# Patient Record
Sex: FEMALE | Race: WHITE | NOT HISPANIC OR LATINO | Employment: UNEMPLOYED | URBAN - METROPOLITAN AREA
[De-identification: names, ages, dates, MRNs, and addresses within clinical notes are randomized per-mention and may not be internally consistent; named-entity substitution may affect disease eponyms.]

---

## 2017-05-15 ENCOUNTER — ALLSCRIPTS OFFICE VISIT (OUTPATIENT)
Dept: OTHER | Facility: OTHER | Age: 82
End: 2017-05-15

## 2017-06-18 ENCOUNTER — GENERIC CONVERSION - ENCOUNTER (OUTPATIENT)
Dept: OTHER | Facility: OTHER | Age: 82
End: 2017-06-18

## 2017-06-18 ENCOUNTER — APPOINTMENT (OUTPATIENT)
Dept: LAB | Facility: HOSPITAL | Age: 82
End: 2017-06-18
Payer: COMMERCIAL

## 2017-06-18 ENCOUNTER — TRANSCRIBE ORDERS (OUTPATIENT)
Dept: LAB | Facility: HOSPITAL | Age: 82
End: 2017-06-18

## 2017-06-18 DIAGNOSIS — M81.0 AGE-RELATED OSTEOPOROSIS WITHOUT CURRENT PATHOLOGICAL FRACTURE: ICD-10-CM

## 2017-06-18 DIAGNOSIS — F03.90 DEMENTIA WITHOUT BEHAVIORAL DISTURBANCE (HCC): ICD-10-CM

## 2017-06-18 DIAGNOSIS — I10 ESSENTIAL (PRIMARY) HYPERTENSION: ICD-10-CM

## 2017-06-18 DIAGNOSIS — D64.9 ANEMIA: ICD-10-CM

## 2017-06-18 LAB
ALBUMIN SERPL BCP-MCNC: 3 G/DL (ref 3.5–5)
ALP SERPL-CCNC: 110 U/L (ref 46–116)
ALT SERPL W P-5'-P-CCNC: 18 U/L (ref 12–78)
ANION GAP SERPL CALCULATED.3IONS-SCNC: 5 MMOL/L (ref 4–13)
AST SERPL W P-5'-P-CCNC: 24 U/L (ref 5–45)
BASOPHILS # BLD AUTO: 0.02 THOUSANDS/ΜL (ref 0–0.1)
BASOPHILS NFR BLD AUTO: 0 % (ref 0–1)
BILIRUB SERPL-MCNC: 0.49 MG/DL (ref 0.2–1)
BUN SERPL-MCNC: 24 MG/DL (ref 5–25)
CALCIUM SERPL-MCNC: 8.4 MG/DL (ref 8.3–10.1)
CHLORIDE SERPL-SCNC: 105 MMOL/L (ref 100–108)
CO2 SERPL-SCNC: 29 MMOL/L (ref 21–32)
CREAT SERPL-MCNC: 0.69 MG/DL (ref 0.6–1.3)
EOSINOPHIL # BLD AUTO: 0.14 THOUSAND/ΜL (ref 0–0.61)
EOSINOPHIL NFR BLD AUTO: 2 % (ref 0–6)
ERYTHROCYTE [DISTWIDTH] IN BLOOD BY AUTOMATED COUNT: 14.2 % (ref 11.6–15.1)
GFR SERPL CREATININE-BSD FRML MDRD: >60 ML/MIN/1.73SQ M
GLUCOSE P FAST SERPL-MCNC: 86 MG/DL (ref 65–99)
HCT VFR BLD AUTO: 34.2 % (ref 34.8–46.1)
HGB BLD-MCNC: 11.3 G/DL (ref 11.5–15.4)
LYMPHOCYTES # BLD AUTO: 2.77 THOUSANDS/ΜL (ref 0.6–4.47)
LYMPHOCYTES NFR BLD AUTO: 44 % (ref 14–44)
MCH RBC QN AUTO: 32.1 PG (ref 26.8–34.3)
MCHC RBC AUTO-ENTMCNC: 33 G/DL (ref 31.4–37.4)
MCV RBC AUTO: 97 FL (ref 82–98)
MONOCYTES # BLD AUTO: 0.43 THOUSAND/ΜL (ref 0.17–1.22)
MONOCYTES NFR BLD AUTO: 7 % (ref 4–12)
NEUTROPHILS # BLD AUTO: 3 THOUSANDS/ΜL (ref 1.85–7.62)
NEUTS SEG NFR BLD AUTO: 47 % (ref 43–75)
NRBC BLD AUTO-RTO: 0 /100 WBCS
PLATELET # BLD AUTO: 242 THOUSANDS/UL (ref 149–390)
PMV BLD AUTO: 10.2 FL (ref 8.9–12.7)
POTASSIUM SERPL-SCNC: 4.1 MMOL/L (ref 3.5–5.3)
PROT SERPL-MCNC: 6.7 G/DL (ref 6.4–8.2)
RBC # BLD AUTO: 3.52 MILLION/UL (ref 3.81–5.12)
SODIUM SERPL-SCNC: 139 MMOL/L (ref 136–145)
WBC # BLD AUTO: 6.37 THOUSAND/UL (ref 4.31–10.16)

## 2017-06-18 PROCEDURE — 36415 COLL VENOUS BLD VENIPUNCTURE: CPT

## 2017-06-18 PROCEDURE — 80053 COMPREHEN METABOLIC PANEL: CPT

## 2017-06-18 PROCEDURE — 85025 COMPLETE CBC W/AUTO DIFF WBC: CPT

## 2017-11-02 ENCOUNTER — ALLSCRIPTS OFFICE VISIT (OUTPATIENT)
Dept: OTHER | Facility: OTHER | Age: 82
End: 2017-11-02

## 2017-11-04 NOTE — PROGRESS NOTES
Assessment  1  Primary biliary cholangitis (571 6) (K74 3)    Plan  Primary biliary cholangitis    · Ursodiol 250 MG Oral Tablet; Take 2 Tablets in the morning and 1 tablet in the  evening   Rx By: Bella Cabrera; Dispense: 0 Days ; #:270 TAB; Refill: 5;For: Primary biliary cholangitis; KJ = N; Verified Transmission to [facility]; Last Updated By: Elaina Barry; 11/2/2017 2:24:00 PM   · (1) HEPATIC FUNCTION PANEL; Status:Active; Requested KDF:[id number];    Perform:[facility]; [id number]; Ordered; For:Primary biliary cholangitis; Ordered By:Alisia Quiñonez;   · (1) HEPATIC FUNCTION PANEL; Status:Active; Requested TXU:92KSK0924;    Perform:[facility]; [id number]; Ordered; For:Primary biliary cholangitis; Ordered By:Alisia Quiñonez;   · Follow-up visit in 6 months Evaluation and Treatment  Follow-up  Status: Hold For -  Scheduling  Requested for: 84VLG7393   Ordered; For: Primary biliary cholangitis; Ordered By: Elaina Barry Performed:  Due: 36ZCC3759    Discussion/Summary  Discussion Summary:   ASSESSMENT:  Isolated alkaline phosphatase elevation secondary to primary biliary cholangitis, seronegative  Patient has been improving with use of ursodiol, and her last alkaline phosphatase from 6 months ago was actually normal    Patient's family has opted against [facility] [address]  screening  Will recheck liver enzymes now and again in 6 months  Office visit with Dr Gudelia Mcintosh in 6 months; at that time can decide about continuing vs stopping ursodiol  I advised patient and patient's son to call the patient experiences any abdominal pain, nausea, jaundice, bowel habit disturbances, etc       Counseling Documentation With Imm: The patient, patient's family was counseled regarding diagnostic results,-- instructions for management,-- risk factor reductions,-- risks and benefits of treatment options,-- importance of compliance with treatment     Medication SE Review and Pt Understands Tx: Possible side effects of new medications were reviewed with the patient/guardian today  Chief Complaint  Chief Complaint Free Text Note Form: up; primary biliary cirrhosis, elevated alkaline phosphatase      History of Present Illness  HPI: female with dementia presents for follow-up regarding primary biliary cirrhosis  She was last seen in the office by Dr Marcus Mendoza a year ago when she was being treated with ursodiol for 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; she had severely elevated alkaline phosphatase back in 2014 but has been improving with the medication  Her last alkaline phosphatase in June of this year was 110  Patient's family had opted against hepatocellular carcinoma screening but were agreeable to continue treatment for PBC  History is currently obtained from patient's son who is present with the patient  The patient has not been experiencing any disturbances with her bowel habits, her appetite has been relatively good and she has not been experiencing significant weight loss  No apparent difficulties with swallowing  The patient denies any abdominal pain or nausea  History Reviewed: The history was obtained today from the patient and patient's family and I agree with the documented history  Review of Systems  Complete-Female GI Adult:   Note: Patient has dementia so review of systems is limited, patient is minimally verbal but does answer yes or no questions   Constitutional: No fever, no chills, feels well, no tiredness, no recent weight gain or weight loss  Eyes: No complaints of eye pain, no red eyes, no eyesight problems, no discharge, no dry eyes, no itching of eyes  ENT: no complaints of earache, no loss of hearing, no nose bleeds, no nasal discharge, no sore throat, no hoarseness  Cardiovascular: No complaints of slow heart rate, no fast heart rate, no chest pain, no palpitations, no leg claudication, no lower extremity edema     Respiratory: No complaints of shortness of breath, no wheezing, no cough, no SOB on exertion, no orthopnea, no PND  Gastrointestinal: as noted in HPI  Genitourinary: No complaints of dysuria, no incontinence, no pelvic pain, no dysmenorrhea, no vaginal discharge or bleeding  Musculoskeletal: No complaints of arthralgias, no myalgias, no joint swelling or stiffness, no limb pain or swelling  Integumentary: No complaints of skin rash or lesions, no itching, no skin wounds, no breast pain or lump  Neurological: No complaints of headache, no confusion, no convulsions, no numbness, no dizziness or fainting, no tingling, no limb weakness, no difficulty walking  Psychiatric: Not suicidal, no sleep disturbance, no anxiety or depression, no change in personality, no emotional problems  Endocrine: No complaints of proptosis, no hot flashes, no muscle weakness, no deepening of the voice, no feelings of weakness  Hematologic/Lymphatic: No complaints of swollen glands, no swollen glands in the neck, does not bleed easily, does not bruise easily  Active Problems  1  Allergic rhinitis (477 9) (J30 9)   2  Anemia (285 9) (D64 9)   3  Dementia (294 20) (F03 90)   4  Dilated bile duct (576 8) (K83 8)   5  Hypertension (401 9) (I10)   6  Limb swelling (729 81) (M79 89)   7  Medicare annual wellness visit, subsequent (V70 0) (Z00 00)   8  Need for 23-polyvalent pneumococcal polysaccharide vaccine (V03 82) (Z23)   9  Need for prophylactic vaccination and inoculation against influenza (V04 81) (Z23)   10  Need for vaccination with 13-polyvalent pneumococcal conjugate vaccine (V03 82) (Z23)   11  Osteoarthritis (715 90) (M19 90)   12  Osteoporosis (733 00) (M81 0)   13  Primary biliary cholangitis (571 6) (K74 3)   14  Sacral pressure sore (707 03,707 20) (L89 159)   15  Urge and stress incontinence (788 33) (N39 46)   16  Uterine prolapse (618 1) (N81 4)   17  Vascular dementia, uncomplicated (919 35) (C99 34)   18  Vitamin D deficiency (268 9) (E55 9)    Past Medical History  1  History of Abnormal gait (781 2) (R26 9)   2  History of Cellulitis (682 9) (L03 90)   3  History of Cellulitis of leg (682 6) (L03 119)   4  History of Coronary Artery Disease (V12 59)   5  History of hypokalemia (V12 29) (Z86 39)   6  History of peripheral neuropathy (V12 49) (Z86 69)   7  Need for prophylactic vaccination and inoculation against influenza (V04 81) (Z23)   8  History of Open Wound Of Right Lower Leg (894 0)   9  History of Scoliosis (737 30) (M41 9)   10  History of Stroke Syndrome (436)  Active Problems And Past Medical History Reviewed: The active problems and past medical history were reviewed and updated today  Surgical History  1  History of Cholecystectomy Laparoscopic   2  History of Repair Of Bladder Reconstruction  Surgical History Reviewed: The surgical history was reviewed and updated today  Family History  Sister    1  Family history of Hypertension (V17 49)   2  Family history of Hypothyroidism  Family History    3  Family history of No Significant Family History  Family History Reviewed: The family history was reviewed and updated today  Social History   · Denied: History of Alcohol   · Never A Smoker  Social History Reviewed: The social history was reviewed and updated today  The social history was reviewed and is unchanged  Current Meds   1  Aspirin-Dipyridamole ER  MG Oral Capsule Extended Release 12 Hour; take 1   capsule twice a day; Therapy: 61LVN2127 to (Evaluate:31Mar2018)  Requested for: 41ROR7074; Last   Rx:02Oct2017 Ordered   2  Desitin 13 % External Cream; apply to pressure sore on buttocks bid and after bm; Therapy: 16HQL3379 to (Evaluate:24Apr2017); Last Rx:47Lpi3632 Ordered   3  Ursodiol 250 MG Oral Tablet; Take 2 Tablets in the morning and 1 tablet in the evening; Therapy: 99ZSN0916 to (Last Rx:46Uio4478)  Requested for: 52Uzn1996 Ordered   4  Vitamin D3 1000 UNIT Oral Tablet; TAKE 1 TABLET DAILY;    Therapy: 50IVW3371 to (Evaluate:73Img8641); Last Rx:79Hqo7802 Ordered  Medication List Reviewed: The medication list was reviewed and updated today  Allergies  1  Bactrim TABS   2  Atropine Sulfate SOLN    Vitals  Vital Signs    Recorded: 78LHZ8140 02:13PM   Temperature 96 9 F   Heart Rate 80   Systolic 315   Diastolic 62   Weight Unobtainable Yes   O2 Saturation 90     Physical Exam    Constitutional   General appearance: Abnormal  -- Wheelchair-bound, minimally verbal, no apparent distress  Eyes   Conjunctiva and lids: No swelling, erythema or discharge  Pupils and irises: Equal, round and reactive to light  Ears, Nose, Mouth, and Throat   External inspection of ears and nose: Normal     Oropharynx: Normal with no erythema, edema, exudate or lesions  Pulmonary   Respiratory effort: No increased work of breathing or signs of respiratory distress  Auscultation of lungs: Clear to auscultation  Cardiovascular   Palpation of heart: Normal PMI, no thrills  Auscultation of heart: Normal rate and rhythm, normal S1 and S2, without murmurs  Examination of extremities for edema and/or varicosities: Normal     Carotid pulses: Normal     Abdomen   Abdomen: Non-tender, no masses  Liver and spleen: No hepatomegaly or splenomegaly  Lymphatic   Palpation of lymph nodes in neck: No lymphadenopathy  Musculoskeletal   Gait and station: Normal     Digits and nails: Normal without clubbing or cyanosis  Inspection/palpation of joints, bones, and muscles: Normal     Skin   Skin and subcutaneous tissue: Normal without rashes or lesions      Psychiatric   Orientation to person, place, and time: Normal     Mood and affect: Normal          Future Appointments    Date/Time Provider Specialty Site   11/15/2017 01:20 PM Porsha He[address] [place]   Electronically signed by : Yun [facility]; Nov 2 2017  2:29PM EST                       (Author)    Electronically signed by : NGUYỄN Rascon ; Nov  3 2017  9:52AM EST                       (Author)

## 2017-11-15 ENCOUNTER — ALLSCRIPTS OFFICE VISIT (OUTPATIENT)
Dept: OTHER | Facility: OTHER | Age: 82
End: 2017-11-15

## 2017-11-15 DIAGNOSIS — M81.0 AGE-RELATED OSTEOPOROSIS WITHOUT CURRENT PATHOLOGICAL FRACTURE: ICD-10-CM

## 2017-11-15 DIAGNOSIS — D64.9 ANEMIA: ICD-10-CM

## 2017-11-15 DIAGNOSIS — F03.90 DEMENTIA WITHOUT BEHAVIORAL DISTURBANCE (HCC): ICD-10-CM

## 2017-11-15 DIAGNOSIS — I10 ESSENTIAL (PRIMARY) HYPERTENSION: ICD-10-CM

## 2017-11-16 NOTE — PROGRESS NOTES
Assessment    1  Hypertension (401 9) (I10)   2  Dementia (294 20) (F03 90)   3  Osteoporosis (733 00) (M81 0)   4  Anemia (285 9) (D64 9)   5  Need for prophylactic vaccination and inoculation against influenza (V04 81) (Z23)    Plan  Anemia, Dementia, Hypertension, Osteoporosis    · (1) BASIC METABOLIC PROFILE; Status:Active; Requested for:76Gyy2031;    · (1) CBC/PLT/DIFF; Status:Active; Requested for:79Wsn6174;    · (1) TSH WITH FT4 REFLEX; Status:Active; Requested for:46Rqx8736; Health Maintenance    · *VB - Fall Risk Assessment  (Dx Z13 89 Screen for Neurologic Disorder);Status:Complete;   Done: 81CEA6807 01:38PM   · *VB-Depression Screening; Status:Complete;   Done: 00DPM9285 01:38PM  Need for prophylactic vaccination and inoculation against influenza    · Fluzone High-Dose 0 5 ML Intramuscular Suspension Prefilled Syringe;INJECT 0 5  ML Intramuscular; To Be Done: 90UYJ7374  Primary biliary cholangitis    · Ursodiol 250 MG Oral Tablet; Take 2 Tablets in the morning and 1 tablet in theevening  Vascular dementia, uncomplicated    · Aspirin-Dipyridamole ER  MG Oral Capsule Extended Release 12 Hour;take 1 capsule twice a day  Vitamin D deficiency    · Vitamin D3 1000 UNIT Oral Tablet; TAKE 1 TABLET DAILY    Discussion/Summary    Will check labs  current meds  GI as scheduled  flu shot today  prevnar 5/2015  Got pneumovax 6/2016  precautions  in 6 month or sooner as needed  Possible side effects of new medications were reviewed with the patient/guardian today  The treatment plan was reviewed with the patient/guardian  The patient/guardian understands and agrees with the treatment plan      Chief Complaint  Patient presents for a 6 month follow up      History of Present Illness  Pt is here with son-in-law  GI for elevated liver enzymes/primary biliary cholangitis  On ursodiol which helped  ---stable  Pt can feed herself  Appetite is good per son-in-law  BM normal  She is usually sit at home  No rash   No combative behavior  incontinence ---at least 4-5 pads per day had hx of CVA in 1992 and had left side deficit  She is on ASA-dipyridamole bid  Had some bruise on legs  today 138/86  Denies headache, SOB or CP  Does not take any BP meds  alendronate for 1 year  Refused more Dexa  with daughter and son-in-law  recent falls  Her dementia has been stable since the last visit  Symptoms: The patient is currently asymptomatic  Associated Symptoms: no frequent falls  Social Activities: The patient is currently living with family  Medications: the patient is adherent with her medication regimen  -- She denies medication side effects  The patient presents for follow-up of essential hypertension  The patient states she has been stable with her blood pressure control since the last visit  Comorbid Illnesses: a stroke  Symptoms: The patient is currently asymptomatic  Home monitoring: The patient is not checking blood pressure at home  Medications: The patient is not currently on any medications for her hypertension--lifestyle modification only  Review of Systems   Constitutional: No fever, no chills, feels well, no tiredness, no recent weight gain or weight loss  ENT: no complaints of earache, no loss of hearing, no nose bleeds, no nasal discharge, no sore throat, no hoarseness  Cardiovascular: No complaints of slow heart rate, no fast heart rate, no chest pain, no palpitations, no leg claudication, no lower extremity edema  Respiratory: No complaints of shortness of breath, no wheezing, no cough, no SOB on exertion, no orthopnea, no PND  Gastrointestinal: No complaints of abdominal pain, no constipation, no nausea or vomiting, no diarrhea, no bloody stools  Musculoskeletal: No complaints of arthralgias, no myalgias, no joint swelling or stiffness, no limb pain or swelling  Preventive Quality 65 and Older: The patient is currently asymptomatic Symptoms Include: no recent fall       Active Problems  1   Allergic rhinitis (477 9) (J30 9)   2  Anemia (285 9) (D64 9)   3  Dementia (294 20) (F03 90)   4  Dilated bile duct (576 8) (K83 8)   5  Hypertension (401 9) (I10)   6  Limb swelling (729 81) (M79 89)   7  Medicare annual wellness visit, subsequent (V70 0) (Z00 00)   8  Need for 23-polyvalent pneumococcal polysaccharide vaccine (V03 82) (Z23)   9  Need for prophylactic vaccination and inoculation against influenza (V04 81) (Z23)   10  Need for vaccination with 13-polyvalent pneumococcal conjugate vaccine (V03 82) (Z23)   11  Osteoarthritis (715 90) (M19 90)   12  Osteoporosis (733 00) (M81 0)   13  Primary biliary cholangitis (571 6) (K74 3)   14  Urge and stress incontinence (788 33) (N39 46)   15  Uterine prolapse (618 1) (N81 4)   16  Vascular dementia, uncomplicated (349 01) (X59 12)   17  Vitamin D deficiency (268 9) (E55 9)    Past Medical History  1  History of Abnormal gait (781 2) (R26 9)   2  History of Cellulitis (682 9) (L03 90)   3  History of Cellulitis of leg (682 6) (L03 119)   4  History of Coronary Artery Disease (V12 59)   5  History of hypokalemia (V12 29) (Z86 39)   6  History of peripheral neuropathy (V12 49) (Z86 69)   7  Need for prophylactic vaccination and inoculation against influenza (V04 81) (Z23)   8  History of Open Wound Of Right Lower Leg (894 0)   9  History of Scoliosis (737 30) (M41 9)   10  History of Stroke Syndrome (436)    Surgical History    1  History of Cholecystectomy Laparoscopic   2  History of Repair Of Bladder Reconstruction    Family History  Sister    1  Family history of Hypertension (V17 49)   2  Family history of Hypothyroidism  Family History    3  Family history of No Significant Family History    Social History     · Denied: History of Alcohol   · Never A Smoker    Current Meds   1  Aspirin-Dipyridamole ER  MG Oral Capsule Extended Release 12 Hour; take 1 capsule twice a day;  Therapy: 19PHK3404 to (394 6960)  Requested for: 85LBH3968; Last Rx:02Oct2017 Ordered 2  Desitin 13 % External Cream; apply to pressure sore on buttocks bid and after bm; Therapy: 83NIQ3392 to (Evaluate:24Apr2017); Last Rx:26Oct2016 Ordered   3  Ursodiol 250 MG Oral Tablet; Take 2 Tablets in the morning and 1 tablet in the evening; Therapy: 55HIO2098 to (Last Rx:21Tph1143)  Requested for: 03PMN2512 Ordered   4  Vitamin D3 1000 UNIT Oral Tablet; TAKE 1 TABLET DAILY; Therapy: 17HDP4684 to (Evaluate:09Mar2015); Last Rx:67Ctr9797 Ordered    Allergies  1  Bactrim TABS   2  Atropine Sulfate SOLN    Vitals  Vital Signs    Recorded: 92LEF2192 01:02PM   Temperature 97 7 F, Tympanic   Heart Rate 78, L Radial   Pulse Quality Normal, L Radial   Respiration Quality Normal   Respiration 16   Systolic 986, LUE, Sitting   Diastolic 86, LUE, Sitting   Height 4 ft 9 in   Weight 115 lb    BMI Calculated 24 89   BSA Calculated 1 42   Pain Scale 0       Physical Exam   Constitutional  General appearance: No acute distress, well appearing and well nourished  Pulmonary  Respiratory effort: No increased work of breathing or signs of respiratory distress  Auscultation of lungs: Clear to auscultation  Cardiovascular  Auscultation of heart: Normal rate and rhythm, normal S1 and S2, without murmurs  Examination of extremities for edema and/or varicosities: Normal    Carotid pulses: Normal    Abdomen  Abdomen: Non-tender, no masses  Liver and spleen: No hepatomegaly or splenomegaly  Lymphatic  Palpation of lymph nodes in neck: No lymphadenopathy  Musculoskeletal  Gait and station: Normal          Results/Data  (1) CBC/PLT/DIFF 26QKI1254 09:29AM Toanfito Perez    Order Number: [id number]     Test Name Result Flag Reference   WBC COUNT 6 37 Thousand/uL  4 31-10 16   RBC COUNT 3 52 Million/uL L 3 81-5 12   HEMOGLOBIN 11 3 g/dL L 11 5-15 4   HEMATOCRIT 34 2 % L 34 8-46  1   MCV 97 fL  82-98   MCH 32 1 pg  26 8-34 3   MCHC 33 0 g/dL  31 4-37 4   RDW 14 2 %  11 6-15 1   MPV 10 2 fL  8 9-12 7   PLATELET COUNT 367 Thousands/uL  149-390   nRBC AUTOMATED 0 /100 WBCs     NEUTROPHILS RELATIVE PERCENT 47 %  43-75   LYMPHOCYTES RELATIVE PERCENT 44 %  14-44   MONOCYTES RELATIVE PERCENT 7 %  4-12   EOSINOPHILS RELATIVE PERCENT 2 %  0-6   BASOPHILS RELATIVE PERCENT 0 %  0-1   NEUTROPHILS ABSOLUTE COUNT 3 00 Thousands/? ??L  1 85-7 62   LYMPHOCYTES ABSOLUTE COUNT 2 77 Thousands/? ??L  0 60-4 47   MONOCYTES ABSOLUTE COUNT 0 43 Thousand/? ??L  0 17-1 22   EOSINOPHILS ABSOLUTE COUNT 0 14 Thousand/? ??L  0 00-0 61   BASOPHILS ABSOLUTE COUNT 0 02 Thousands/? ??L  0 00-0 10     - Patient Instructions: This bloodwork is non-fasting  Please drink two glasses of water morning of bloodwork  (1) COMPREHENSIVE METABOLIC PANEL 30OIH5103 61:76TK Sue Mobley   TW Order Number: [id number]     Test Name Result Flag Reference   SODIUM 139 mmol/L  136-145   POTASSIUM 4 1 mmol/L  3 5-5 3   CHLORIDE 105 mmol/L  100-108   CARBON DIOXIDE 29 mmol/L  21-32   ANION GAP (CALC) 5 mmol/L  4-13   BLOOD UREA NITROGEN 24 mg/dL  5-25   CREATININE 0 69 mg/dL  0 60-1 30   Standardized to IDMS reference method   CALCIUM 8 4 mg/dL  8 3-10 1   BILI, TOTAL 0 49 mg/dL  0 20-1 00   ALK PHOSPHATAS 110 U/L     ALT (SGPT) 18 U/L  12-78   AST(SGOT) 24 U/L  5-45   ALBUMIN 3 0 g/dL L 3 5-5 0   TOTAL PROTEIN 6 7 g/dL  6 4-8 2   eGFR Non-African American      >60 0 ml/min/1 73sq [facility] Disease Education Program recommendations are as follows: GFR calculation is accurate only with a steady state creatinine Chronic Kidney disease less than 60 ml/min/1 73 sq  meters Kidney failure less than 15 ml/min/1 73 sq  meters  GLUCOSE FASTING 86 mg/dL  65-99       Signatures   Electronically signed by :  Angelic Foss MD; Nov 15 2017  1:39PM EST                       (Author)

## 2018-01-10 NOTE — RESULT NOTES
Message   TSH normal   WBC normal   Hg normal   Platelet normal   electrolytes normal   kidney function normal   liver enzymes normal    Alk phosphate 134 much better  vitD 37 3 normal     Verified Results  (1) COMPREHENSIVE METABOLIC PANEL 60FXD6199 06:16XW [employer]   TW Order Number: [id number]     Test Name Result Flag Reference   GLUCOSE,RANDM 86 mg/dL     If the patient is fasting, the ADA then defines impaired fasting glucose as > 100 mg/dL and diabetes as > or equal to 123 mg/dL  SODIUM 138 mmol/L  136-145   POTASSIUM 4 1 mmol/L  3 5-5 3   CHLORIDE 102 mmol/L  100-108   CARBON DIOXIDE 31 mmol/L  21-32   ANION GAP (CALC) 5 mmol/L  4-13   BLOOD UREA NITROGEN 22 mg/dL  5-25   CREATININE 0 74 mg/dL  0 60-1 30   Standardized to IDMS reference method   CALCIUM 8 7 mg/dL  8 3-10 1   BILI, TOTAL 0 34 mg/dL  0 20-1 00   ALK PHOSPHATAS 134 U/L H    ALT (SGPT) 20 U/L  12-78   AST(SGOT) 23 U/L  5-45   ALBUMIN 3 4 g/dL L 3 5-5 0   TOTAL PROTEIN 7 1 g/dL  6 4-8 2   eGFR Non-African American      >60 0 ml/min/1 73sq m   [facility] Disease Education Program recommendations are as follows:  GFR calculation is accurate only with a steady state creatinine  Chronic Kidney disease less than 60 ml/min/1 73 sq  meters  Kidney failure less than 15 ml/min/1 73 sq  meters  (1) CBC/PLT/DIFF 93GWU7524 08:59AM [employer]   TW Order Number: [id number]     Test Name Result Flag Reference   WBC COUNT 6 48 Thousand/uL  4 31-10 16   RBC COUNT 3 79 Million/uL L 3 81-5 12   HEMOGLOBIN 12 2 g/dL  11 5-15 4   HEMATOCRIT 36 6 %  34 8-46  1   MCV 97 fL  82-98   MCH 32 2 pg  26 8-34 3   MCHC 33 3 g/dL  31 4-37 4   RDW 13 5 %  11 6-15 1   MPV 9 9 fL  8 9-12 7   PLATELET COUNT 269 Thousands/uL  149-390   nRBC AUTOMATED 0 /100 WBCs     NEUTROPHILS RELATIVE PERCENT 54 %  43-75   LYMPHOCYTES RELATIVE PERCENT 36 %  14-44   MONOCYTES RELATIVE PERCENT 7 %  4-12   EOSINOPHILS RELATIVE PERCENT 2 %  0-6 BASOPHILS RELATIVE PERCENT 1 %  0-1   NEUTROPHILS ABSOLUTE COUNT 3 54 Thousands/?L  1 85-7 62   LYMPHOCYTES ABSOLUTE COUNT 2 33 Thousands/?L  0 60-4 47   MONOCYTES ABSOLUTE COUNT 0 45 Thousand/?L  0 17-1 22   EOSINOPHILS ABSOLUTE COUNT 0 10 Thousand/?L  0 00-0 61   BASOPHILS ABSOLUTE COUNT 0 03 Thousands/?L  0 00-0 10     (1) TSH WITH FT4 REFLEX 27Nov2016 08:59AM [facility]: [id number]     Test Name Result Flag Reference   TSH 3 090 uIU/mL  0 358-3 740   Patients undergoing fluorescein dye angiography may retain small amounts of fluorescein in the body for 48-72 hours post procedure  Samples containing fluorescein can produce falsely depressed TSH values  If the patient had this procedure,a specimen should be resubmitted post fluorescein clearance  The recommended reference ranges for TSH during pregnancy are as follows:  First trimester 0 1 to 2 5 uIU/mL  Second trimester  0 2 to 3 0 uIU/mL  Third trimester 0 3 to 3 0 uIU/m     (1) VITAMIN D 25-HYDROXY 27Nov2016 08:59AM [facility] Order Number: [id number]     Test Name Result Flag Reference   VIT D 25-HYDROX 37 3 ng/mL  30 0-100 0   This assay is a certified procedure of the CDC Vitamin D Standardization Certification Program (VDSCP)     Deficiency <20ng/ml   Insufficiency 20-30ng/ml   Sufficient  ng/ml     *Patients undergoing fluorescein dye angiography may retain small amounts of fluorescein in the body for 48-72 hours post procedure  Samples containing fluorescein can produce falsely elevated Vitamin D values  If the patient had this procedure, a specimen should be resubmitted post fluorescein clearance

## 2018-01-10 NOTE — PROGRESS NOTES
Assessment    1  Elevated serum alkaline phosphatase level (790 5) (R74 8)    Plan  Elevated serum alkaline phosphatase level    · (1) HEPATIC FUNCTION PANEL; Status:Hold For - Exact Date; Requested for:Approx  [id number];    Perform:[facility];Ordered;  For:Elevated serum alkaline phosphatase level; Ordered By:Rafita Hernandez;    Discussion/Summary  Discussion Summary: This is a pleasant 55-year-old female with elevated alkaline phosphatase  I doubt that this is due to obstructive process, the family was reluctant to proceed with MRI or any procedures  #1 elevated alkaline phosphatase: It has improved with ursodiol alone  She may have a component of primary biliary cirrhosis  Though serologically all her tests have been unremarkable  At this point given that she is doing well we'll continue her current therapies   -Continue ursodiol  -Repeat LFTs in 3 months, at that time if completely normal we can try to stop the ursodiol  -If she ever has pain, fevers, jaundice she will need additional imaging and possible ERCP  Chief Complaint  Chief Complaint Free Text Note Form: Follow-up of elevated LFTs      History of Present Illness  HPI: Is is a pleasant 55-year-old female who comes in for follow-up  As you know she's had an elevated alkaline phosphatase in the past in the absence of pain  Ultrasound did not show any obstructive process though her bile duct is mildly dilated  On previous visits after long discussions with her family we decided not stopped for further imaging or possibly procedures  She was placed on ursodiol and her LFTs have gradually improved  She's had no side effects, she denies any pruritus, her appetite is good  Her weight is stable  Review of Systems  ROS Reviewed:   ROS reviewed  Active Problems    1  Abnormal LFTs (liver function tests) (790 6) (R79 89)   2  Allergic rhinitis (477 9) (J30 9)   3  Anemia (285 9) (D64 9)   4  Cellulitis of leg (682 6) (L03 119)   5  Dementia (294 20) (F03 90)   6  Dilated bile duct (576 8) (K83 8)   7  Elevated serum alkaline phosphatase level (790 5) (R74 8)   8  Hypertension (401 9) (I10)   9  Hypokalemia (276 8) (E87 6)   10  Limb swelling (729 81) (M79 89)   11  Need for prophylactic vaccination and inoculation against influenza (V04 81) (Z23)   12  Need for vaccination with 13-polyvalent pneumococcal conjugate vaccine (V03 82) (Z23)   13  Open Wound Of Right Lower Leg (894 0)   14  Osteoarthritis (715 90) (M19 90)   15  Osteoporosis (733 00) (M81 0)   16  Urge and stress incontinence (788 33) (N39 46)   17  Uterine prolapse (618 1) (N81 4)   18  Vascular Dementia (290 40)   19  Vitamin D deficiency (268 9) (E55 9)    Past Medical History    1  History of Abnormal gait (781 2) (R26 9)   2  History of Cellulitis (682 9) (L03 90)   3  History of Coronary Artery Disease (V12 59)   4  History of peripheral neuropathy (V12 49) (Z86 69)   5  Need for prophylactic vaccination and inoculation against influenza (V04 81) (Z23)   6  History of Scoliosis (737 30) (M41 9)   7  History of Stroke Syndrome (436)  Active Problems And Past Medical History Reviewed: The active problems and past medical history were reviewed and updated today  Surgical History    1  History of Cholecystectomy Laparoscopic   2  History of Repair Of Bladder Reconstruction  Surgical History Reviewed: The surgical history was reviewed and updated today  Family History    1  Family history of Hypertension (V17 49)   2  Family history of Hypothyroidism    3  Family history of No Significant Family History    Social History    · Denied: History of Alcohol   · Never A Smoker    Current Meds   1  Aggrenox  MG Oral Capsule Extended Release 12 Hour; take 1 capsule twice a   day; Therapy: 65Vif0922 to (Evaluate:09Jan2016)  Requested for: 79VQL7718; Last   Rx:10Jne1352 Ordered   2   Aspirin-Dipyridamole ER  MG Oral Capsule Extended Release 12 Hour; take 1 capsule twice a day; Therapy: 29UKN7615 to (Evaluate:35Ouz8645)  Requested for: 87BIU3043; Last   Rx:06Jan2016 Ordered   3  Furosemide 20 MG Oral Tablet; TAKE 1 TABLET DAILY; Therapy: 57PXQ9924 to (Evaluate:44Rsl8698)  Requested for: 01DPJ8116; Last   Rx:19Nov2015 Ordered   4  Klor-Con M10 10 MEQ Oral Tablet Extended Release; take 1 tablet twice a day; Therapy: 24BFH7851 to (Evaluate:15Qew6338)  Requested for: 76TRB5457; Last   Rx:51Nwn5666 Ordered   5  Ursodiol 250 MG Oral Tablet; Take 2 Tablets in the morning and 1 tablet in the evening; Therapy: 11SSM0610 to (Claudetta Harry)  Requested for: 53YWB0368; Last   Rx:30Kws3845 Ordered   6  Vitamin D3 1000 UNIT Oral Tablet; TAKE 1 TABLET DAILY; Therapy: 26MTN2415 to (Evaluate:09Mar2015); Last Rx:38Skd2376 Ordered  Medication List Reviewed: The medication list was reviewed and updated today  Allergies    1  Bactrim TABS   2  Atropine Sulfate SOLN    Vitals  Vital Signs [Data Includes: Current Encounter]    Recorded: 42MJP8793 11:16AM   Heart Rate 72   Respiration 14   Systolic 311   Diastolic 70   Height 4 ft 9 in   Weight 111 lb    BMI Calculated 24 02   BSA Calculated 1 4   O2 Saturation 97     Physical Exam   Gen  : Elderly female, wheelchair bound, no acute distress  HEENT: Anicteric, moist mucous membranes, no cervical or supraclavicular lymphadenopathy  Cardiovascular: Regular rate and rhythm, no murmurs rubs or gallops  Pulmonary: Clear to auscultation bilaterally  Abdomen: Soft, nontender, nondistended  No hepatosplenomegaly  Bowel sounds present and regular    Extremities: No cyanosis, clubbing, or edema,  Skin: No rashes  Neuro: Alert, awake, oriented x3         Signatures   Electronically signed by : NGUYỄN Porter ; Jan 29 2016 11:55AM EST                       (Author)

## 2018-01-13 VITALS
HEART RATE: 76 BPM | RESPIRATION RATE: 12 BRPM | DIASTOLIC BLOOD PRESSURE: 88 MMHG | SYSTOLIC BLOOD PRESSURE: 144 MMHG | TEMPERATURE: 99 F

## 2018-01-13 VITALS
TEMPERATURE: 97.7 F | HEIGHT: 57 IN | HEART RATE: 78 BPM | BODY MASS INDEX: 24.81 KG/M2 | DIASTOLIC BLOOD PRESSURE: 86 MMHG | WEIGHT: 115 LBS | SYSTOLIC BLOOD PRESSURE: 138 MMHG | RESPIRATION RATE: 16 BRPM

## 2018-01-13 VITALS
DIASTOLIC BLOOD PRESSURE: 62 MMHG | TEMPERATURE: 96.9 F | OXYGEN SATURATION: 90 % | SYSTOLIC BLOOD PRESSURE: 124 MMHG | HEART RATE: 80 BPM

## 2018-01-13 NOTE — RESULT NOTES
Verified Results  (1) CBC/PLT/DIFF 92VKR4588 09:29AM Latrice Pena    Order Number: [id number]     Test Name Result Flag Reference   WBC COUNT 6 37 Thousand/uL  4 31-10 16   RBC COUNT 3 52 Million/uL L 3 81-5 12   HEMOGLOBIN 11 3 g/dL L 11 5-15 4   HEMATOCRIT 34 2 % L 34 8-46  1   MCV 97 fL  82-98   MCH 32 1 pg  26 8-34 3   MCHC 33 0 g/dL  31 4-37 4   RDW 14 2 %  11 6-15 1   MPV 10 2 fL  8 9-12 7   PLATELET COUNT 902 Thousands/uL  149-390   nRBC AUTOMATED 0 /100 WBCs     NEUTROPHILS RELATIVE PERCENT 47 %  43-75   LYMPHOCYTES RELATIVE PERCENT 44 %  14-44   MONOCYTES RELATIVE PERCENT 7 %  4-12   EOSINOPHILS RELATIVE PERCENT 2 %  0-6   BASOPHILS RELATIVE PERCENT 0 %  0-1   NEUTROPHILS ABSOLUTE COUNT 3 00 Thousands/? ??L  1 85-7 62   LYMPHOCYTES ABSOLUTE COUNT 2 77 Thousands/? ??L  0 60-4 47   MONOCYTES ABSOLUTE COUNT 0 43 Thousand/? ??L  0 17-1 22   EOSINOPHILS ABSOLUTE COUNT 0 14 Thousand/? ??L  0 00-0 61   BASOPHILS ABSOLUTE COUNT 0 02 Thousands/? ??L  0 00-0 10   - Patient Instructions: This bloodwork is non-fasting  Please drink two glasses of water morning of bloodwork       (1) COMPREHENSIVE METABOLIC PANEL 51ZJS3344 01:95WH Latrice Pena    Order Number: [id number]     Test Name Result Flag Reference   SODIUM 139 mmol/L  136-145   POTASSIUM 4 1 mmol/L  3 5-5 3   CHLORIDE 105 mmol/L  100-108   CARBON DIOXIDE 29 mmol/L  21-32   ANION GAP (CALC) 5 mmol/L  4-13   BLOOD UREA NITROGEN 24 mg/dL  5-25   CREATININE 0 69 mg/dL  0 60-1 30   Standardized to IDMS reference method   CALCIUM 8 4 mg/dL  8 3-10 1   BILI, TOTAL 0 49 mg/dL  0 20-1 00   ALK PHOSPHATAS 110 U/L     ALT (SGPT) 18 U/L  12-78   AST(SGOT) 24 U/L  5-45   ALBUMIN 3 0 g/dL L 3 5-5 0   TOTAL PROTEIN 6 7 g/dL  6 4-8 2   eGFR Non-African American      >60 0 ml/min/1 73sq m   [facility] [employer] Disease Education Program recommendations are as follows:  GFR calculation is accurate only with a steady state creatinine  Chronic Kidney disease less than 60 ml/min/1 73 sq  meters  Kidney failure less than 15 ml/min/1 73 sq  meters     GLUCOSE FASTING 86 mg/dL  65-99

## 2018-01-15 NOTE — RESULT NOTES
Message   Results were discussed with the patient and her son during her office visit     Verified Results  (1) HEPATIC FUNCTION PANEL 43Lxp4435 10:06AM Rafita Hernandez     Test Name Result Flag Reference   Protein, Total, Serum 6 4 g/dL  6 0-8 5   Albumin, Serum 3 5 g/dL  3 5-4 7   Bilirubin, Total 0 5 mg/dL  0 0-1 2   Bilirubin, Direct 0 16 mg/dL  0 00-0 40   Alkaline Phosphatase, S 170 IU/L H    AST (SGOT) 25 IU/L  0-40   ALT (SGPT) 16 IU/L  0-32       Signatures   Electronically signed by : NGUYỄN Leslie ; Apr 28 2016  3:26PM EST                       (Author)

## 2018-01-15 NOTE — PROGRESS NOTES
Assessment    1  Medicare annual wellness visit, subsequent (V70 0) (Z00 00)   2  Vascular dementia, uncomplicated (992 06) (H83 41)   3  Need for prophylactic vaccination and inoculation against influenza (V04 81) (Z23)    Plan  Abnormal LFTs (liver function tests)    · Ursodiol 250 MG Oral Tablet; Take 2 Tablets in the morning and 1 tablet in the  evening  Medicare annual wellness visit, subsequent    · *VB - Fall Risk Assessment  (Dx Z13 89 Screen for Neurologic Disorder);  Status:Complete;   Done: 46ONT5817 09:56AM   · *VB-Depression Screening; Status:Complete;   Done: 95UFN2202 09:56AM  Medicare annual wellness visit, subsequent, Need for prophylactic vaccination and  inoculation against influenza    · Fluzone High-Dose 0 5 ML Intramuscular Suspension Prefilled Syringe  Vascular dementia, uncomplicated    · Aspirin-Dipyridamole ER  MG Oral Capsule Extended Release 12  Hour; take 1 capsule twice a day  Vitamin D deficiency    · Vitamin D3 1000 UNIT Oral Tablet; TAKE 1 TABLET DAILY    Discussion/Summary    MMSE 10/30 today  Pt has severe cognitive impairment  Daughter is the POW  Sign physical form  Give flu shot today  RTO as scheduled  Impression: Subsequent Annual Wellness Visit  Cardiovascular screening and counseling: the risks and benefits of screening were discussed and screening is current  Diabetes screening and counseling: the risks and benefits of screening were discussed and screening is current  Immunizations: the risks and benefits of influenza vaccination were discussed with the patient, influenza vaccination is recommended annually, the risks and benefits of pneumococcal vaccination were discussed with the patient and pneumococcal vaccine due today  Advance Directive Planning: not complete, paperwork and instructions were given to the patient  Chief Complaint  Annual wellness exam      History of Present Illness  HPI: Pt is here with son-in-law    [facility] while daughter and son-in-law go away  She did it last year also  She is on wheelchair all day  She does not walk because of dementia/afraid of falling  Denies recent falls  Lives with daughter and son-in-law  Need help for dressing, shower and ADLs  Welcome to [employer] and Wellness Visits: The patient is being seen for the subsequent annual wellness visit  Medicare Screening and Risk Factors   Hospitalizations: no previous hospitalizations  Medicare Screening Tests Risk Questions   Abdominal aortic aneurysm risk assessment: none indicated  Osteoporosis risk assessment: , female gender and over 48years of age  HIV risk assessment: none indicated  Drug and Alcohol Use: The patient reports never drinking alcohol  She has never used illicit drugs  Diet and Physical Activity: Current diet includes 2 servings of fruit per day, 2 servings of vegetables per day, 2 servings of meat per day, 3 servings of whole grains per day, 3 servings of simple carbohydrates per day and 1 servings of dairy products per day  She is sedentary  Exercise: stretching 15 minutes per day  Mood Disorder and Cognitive Impairment Screening: PHQ-9 Depression Scale   Over the past 2 weeks, how often have you been bothered by the following problems? 1 ) Little interest or pleasure in doing things? Not at all    2 ) Feeling down, depressed or hopeless? Not at all    3 ) Trouble falling asleep or sleeping too much? Not at all    4 ) Feeling tired or having little energy? Not at all    5 ) Poor appetite or overeating? Not at all    6 ) Feeling bad about yourself, or that you are a failure, or have let yourself or your family down? Not at all    7 ) Trouble concentrating on things, such as reading a newspaper or watching television? Not at all    8 ) Moving or speaking so slowly that other people could have noticed, or the opposite, moving or speaking faster than usual? Not at all  9 ) Thoughts that you would be off dead or of hurting yourself in some way? Not at all  TOTAL SCORE: 0    Functional Ability/Level of Safety: Hearing is normal bilaterally and a hearing aid is not used  The patient is currently unable to participate in social activities and unable to drive  Activities of daily living details: needs help using the phone, transportation help needed, meal preparation help needed, needs help doing housework, needs help doing laundry, needs help managing medications and needs help managing money  Fall risk factors: The patient fell 0 times in the past 12 months , no polypharmacy, no alcohol use, no mobility impairment, no antidepressant use, no deconditioning, no postural hypotension, no sedative use, no visual impairment, no urinary incontinence, no antihypertensive use, no cognitive impairment, up and go test was normal and no previous fall  Home safety risk factors:  no unfamiliar surroundings, no loose rugs, no poor household lighting, no uneven floors, no household clutter, grab bars in the bathroom and handrails on the stairs  Advance Directives: Advance directives: durable power of  for health care directives, but no advance directives  Co-Managers and Medical Equipment/Suppliers: See Patient Care Team      Patient Care Team    Care Team Member Role Specialty Office Number   Ryan GREENE  Gastroenterology Adult [phone]     Review of Systems    Constitutional: negative  Eyes: negative  ENT: negative  Cardiovascular: negative  Respiratory: negative  Gastrointestinal: negative  Musculoskeletal: negative  Over the past 2 weeks, how often have you been bothered by the following problems? 1 ) Little interest or pleasure in doing things? Not at all    2 ) Feeling down, depressed or hopeless? Not at all    3 ) Trouble falling asleep or sleeping too much? Not at all    4 ) Feeling tired or having little energy?  Not at all    5 ) Poor appetite or overeating? Not at all    6 ) Feeling bad about yourself, or that you are a failure, or have let yourself or your family down? Not at all    7 ) Trouble concentrating on things, such as reading a newspaper or watching television? Not at all    8 ) Moving or speaking so slowly that other people could have noticed, or the opposite, moving or speaking faster than usual? Not at all  How difficult have these problems made it for you to do your work, take care of things at home, or get along with people? Not at all  Score 0      Active Problems    1  Abnormal LFTs (liver function tests) (790 6) (R79 89)   2  Allergic rhinitis (477 9) (J30 9)   3  Anemia (285 9) (D64 9)   4  Cellulitis of leg (682 6) (L03 119)   5  Dementia (294 20) (F03 90)   6  Dilated bile duct (576 8) (K83 8)   7  Elevated serum alkaline phosphatase level (790 5) (R74 8)   8  Hypertension (401 9) (I10)   9  Hypokalemia (276 8) (E87 6)   10  Limb swelling (729 81) (M79 89)   11  Need for 23-polyvalent pneumococcal polysaccharide vaccine (V03 82) (Z23)   12  Need for prophylactic vaccination and inoculation against influenza (V04 81) (Z23)   13  Need for vaccination with 13-polyvalent pneumococcal conjugate vaccine (V03 82) (Z23)   14  Open Wound Of Right Lower Leg (894 0)   15  Osteoarthritis (715 90) (M19 90)   16  Osteoporosis (733 00) (M81 0)   17  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 6) (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 3)   18  Urge and stress incontinence (788 33) (N39 46)   19  Uterine prolapse (618 1) (N81 4)   20  Vascular dementia, uncomplicated (035 98) (F90 58)   21   Vitamin D deficiency (268 9) (E55 9)    Past Medical History    · History of Abnormal gait (781 2) (R26 9)   · History of Cellulitis (682 9) (L03 90)   · History of Coronary Artery Disease (V12 59)   · History of peripheral neuropathy (V12 49) (Z86 69)   · Need for prophylactic vaccination and inoculation against influenza (V04 81) (Z23)   · History of Scoliosis (737 30) (M41 9)   · History of Stroke Syndrome (436)    Surgical History    · History of Cholecystectomy Laparoscopic   · History of Repair Of Bladder Reconstruction    Family History  Sister    · Family history of Hypertension (V17 49)   · Family history of Hypothyroidism  Family History    · Family history of No Significant Family History    Social History    · Denied: History of Alcohol   · Never A Smoker    Current Meds   1  Aspirin-Dipyridamole ER  MG Oral Capsule Extended Release 12 Hour; take 1   capsule twice a day; Therapy: 17EKA5228 to (Evaluate:02Jan2017)  Requested for: 54WSA0861; Last   Rx:68Fwz1448 Ordered   2  Ursodiol 250 MG Oral Tablet; Take 2 Tablets in the morning and 1 tablet in the evening; Therapy: 83PJT3949 to (Sharif Arredondo)  Requested for: 54NRN1863; Last   Rx:36Aim2682 Ordered   3  Vitamin D3 1000 UNIT Oral Tablet; TAKE 1 TABLET DAILY; Therapy: 39ZVL3662 to (Evaluate:09Mar2015); Last Rx:24Pec4735 Ordered    Allergies    1  Bactrim TABS   2  Atropine Sulfate SOLN    Immunizations   ** Printed in Appendix #1 below  Vitals  Signs    Systolic: 862  Diastolic: 72  Heart Rate: 60  Temperature: 97 8 F  Height: 4 ft 9 in  Weight: 115 lb   BMI Calculated: 24 89  BSA Calculated: 1 42    Physical Exam    Constitutional   General appearance: No acute distress, well appearing and well nourished  Ears, Nose, Mouth, and Throat   External inspection of ears and nose: Normal     Otoscopic examination: Tympanic membranes translucent with normal light reflex  Canals patent without erythema  Nasal mucosa, septum, and turbinates: Normal without edema or erythema  Lips, teeth, and gums: Normal, good dentition  Oropharynx: Normal with no erythema, edema, exudate or lesions  Neck   Neck: Supple, symmetric, trachea midline, no masses  Thyroid: Normal, no thyromegaly  Pulmonary   Respiratory effort: No increased work of breathing or signs of respiratory distress  Auscultation of lungs: Clear to auscultation  Cardiovascular   Auscultation of heart: Normal rate and rhythm, normal S1 and S2, no murmurs  Carotid pulses: 2+ bilaterally  Examination of extremities for edema and/or varicosities: Normal     Abdomen   Abdomen: Non-tender, no masses  Liver and spleen: No hepatomegaly or splenomegaly  Lymphatic   Palpation of lymph nodes in neck: No lymphadenopathy  Musculoskeletal   Gait and station: Abnormal   wheelchair  Results/Data  *VB - Fall Risk Assessment  (Dx Z13 89 Screen for Neurologic Disorder) 04DUB4388 09:56AM Minetta Sprain     Test Name Result Flag Reference   Falls Risk      No falls in the past year     *VB-Depression Screening 31OCK3940 09:56AM Minetta Sprain     Test Name Result Flag Reference   Depression Scale Result      Depression Screen - Negative For Symptoms       Future Appointments    Date/Time Provider Specialty Site   2016 10:00 AM Karlene Short MD Family Medicine [address] [place] [address]     Signatures   Electronically signed by :  Roxanne Cornejo MD; Oct 13 2016 12:13PM EST                       (Author)    Appendix #1     Patient: [facility] ; : 10/26/1927; MRN: [id number]      1 2 3 4 5    Influenza  22-Nov-2011 04-Dec-2012 11-Nov-2013 10-Sep-2014 08-Sep-2015    PCV  19-May-2015        PPSV  2006 2016       Td/DT  2007

## 2018-01-15 NOTE — MISCELLANEOUS
To whom it may concern,   Roselyn Wen is under my professional care  Patient has dementia and severe congnitive impairement  She is imcompetent for decision making  If you have any questions, please let me know  Mansi Rosenbaum MD      Electronically signed by: Nancie Alberto MD  Oct 13 2016 10:12AM EST Author

## 2018-01-17 NOTE — RESULT NOTES
Message   WBC normal   hemoglobin normal   platelet normal   kidney function normal   liver enzymes normal   electrolytes normal     Verified Results  (1) CBC/PLT/DIFF [id number] 08:45AM [employer]     Test Name Result Flag Reference   WBC 5 9 x10E3/uL  3 4-10 8   RBC 3 66 x10E6/uL L 3 77-5 28   Hemoglobin 11 8 g/dL  11 1-15 9   Hematocrit 34 8 %  34 0-46  6   MCV 95 fL  79-97   MCH 32 2 pg  26 6-33 0   MCHC 33 9 g/dL  31 5-35 7   RDW 13 6 %  12 3-15 4   Platelets 882 /MY  150-379   Neutrophils 54 %     Lymphs 36 %     Monocytes 8 %     Eos 2 %     Basos 0 %     Neutrophils (Absolute) 3 2 x10E3/uL  1 4-7 0   Lymphs (Absolute) 2 1 x10E3/uL  0 7-3 1   Monocytes(Absolute) 0 5 x10E3/uL  0 1-0 9   Eos (Absolute) 0 1 x10E3/uL  0 0-0 4   Baso (Absolute) 0 0 x10E3/uL  0 0-0 2   Immature Granulocytes 0 %     Immature Grans (Abs) 0 0 x10E3/uL  0 0-0 1     (1) COMPREHENSIVE METABOLIC PANEL 89EVQ2716 22:28QN [employer]     Test Name Result Flag Reference   Glucose, Serum 79 mg/dL  65-99   BUN 21 mg/dL  8-27   Creatinine, Serum 0 73 mg/dL  0 57-1 00   eGFR If NonAfricn Am 74 mL/min/1 73  >59   eGFR If Africn Am 85 mL/min/1 73  >59   BUN/Creatinine Ratio 29 H 11-26   Sodium, Serum 138 mmol/L  134-144   Potassium, Serum 4 6 mmol/L  3 5-5 2   Chloride, Serum 100 mmol/L     Carbon Dioxide, Total 24 mmol/L  18-29   Calcium, Serum 9 0 mg/dL  8 7-10 3   Protein, Total, Serum 6 5 g/dL  6 0-8 5   Albumin, Serum 4 0 g/dL  3 5-4 7   Globulin, Total 2 5 g/dL  1 5-4 5   A/G Ratio 1 6  1 1-2 5   Bilirubin, Total 0 4 mg/dL  0 0-1 2   Alkaline Phosphatase, S 162 IU/L H    AST (SGOT) 28 IU/L  0-40   ALT (SGPT) 16 IU/L  0-32

## 2018-03-21 DIAGNOSIS — F01.50 VASCULAR DEMENTIA WITHOUT BEHAVIORAL DISTURBANCE (HCC): Primary | ICD-10-CM

## 2018-03-22 RX ORDER — ASPIRIN AND DIPYRIDAMOLE 25; 200 MG/1; MG/1
CAPSULE, EXTENDED RELEASE ORAL
Qty: 180 CAPSULE | Refills: 1 | Status: SHIPPED | OUTPATIENT
Start: 2018-03-22 | End: 2018-09-27 | Stop reason: SDUPTHER

## 2018-04-30 ENCOUNTER — TRANSCRIBE ORDERS (OUTPATIENT)
Dept: LAB | Facility: HOSPITAL | Age: 83
End: 2018-04-30

## 2018-04-30 DIAGNOSIS — I10 ESSENTIAL HYPERTENSION, MALIGNANT: ICD-10-CM

## 2018-04-30 DIAGNOSIS — D64.9 ANEMIA, UNSPECIFIED TYPE: Primary | ICD-10-CM

## 2018-04-30 DIAGNOSIS — M81.0 SENILE OSTEOPOROSIS: ICD-10-CM

## 2018-05-07 ENCOUNTER — APPOINTMENT (OUTPATIENT)
Dept: LAB | Facility: HOSPITAL | Age: 83
End: 2018-05-07
Payer: MEDICARE

## 2018-05-07 DIAGNOSIS — F03.90 DEMENTIA WITHOUT BEHAVIORAL DISTURBANCE (HCC): ICD-10-CM

## 2018-05-07 DIAGNOSIS — M81.0 SENILE OSTEOPOROSIS: ICD-10-CM

## 2018-05-07 DIAGNOSIS — I10 ESSENTIAL HYPERTENSION, MALIGNANT: ICD-10-CM

## 2018-05-07 DIAGNOSIS — D64.9 ANEMIA: ICD-10-CM

## 2018-05-07 DIAGNOSIS — M81.0 AGE-RELATED OSTEOPOROSIS WITHOUT CURRENT PATHOLOGICAL FRACTURE: ICD-10-CM

## 2018-05-07 DIAGNOSIS — D64.9 ANEMIA, UNSPECIFIED TYPE: ICD-10-CM

## 2018-05-07 DIAGNOSIS — I10 ESSENTIAL (PRIMARY) HYPERTENSION: ICD-10-CM

## 2018-05-07 LAB
ALBUMIN SERPL BCP-MCNC: 3.3 G/DL (ref 3.5–5)
ALP SERPL-CCNC: 120 U/L (ref 46–116)
ALT SERPL W P-5'-P-CCNC: 19 U/L (ref 12–78)
ANION GAP SERPL CALCULATED.3IONS-SCNC: 7 MMOL/L (ref 4–13)
AST SERPL W P-5'-P-CCNC: 27 U/L (ref 5–45)
BASOPHILS # BLD AUTO: 0.02 THOUSANDS/ΜL (ref 0–0.1)
BASOPHILS NFR BLD AUTO: 0 % (ref 0–1)
BILIRUB DIRECT SERPL-MCNC: 0.15 MG/DL (ref 0–0.2)
BILIRUB SERPL-MCNC: 0.38 MG/DL (ref 0.2–1)
BUN SERPL-MCNC: 31 MG/DL (ref 5–25)
CALCIUM SERPL-MCNC: 8.7 MG/DL (ref 8.3–10.1)
CHLORIDE SERPL-SCNC: 105 MMOL/L (ref 100–108)
CO2 SERPL-SCNC: 27 MMOL/L (ref 21–32)
CREAT SERPL-MCNC: 0.86 MG/DL (ref 0.6–1.3)
EOSINOPHIL # BLD AUTO: 0.08 THOUSAND/ΜL (ref 0–0.61)
EOSINOPHIL NFR BLD AUTO: 1 % (ref 0–6)
ERYTHROCYTE [DISTWIDTH] IN BLOOD BY AUTOMATED COUNT: 13.6 % (ref 11.6–15.1)
GFR SERPL CREATININE-BSD FRML MDRD: 60 ML/MIN/1.73SQ M
GLUCOSE P FAST SERPL-MCNC: 104 MG/DL (ref 65–99)
HCT VFR BLD AUTO: 35.9 % (ref 34.8–46.1)
HGB BLD-MCNC: 11.5 G/DL (ref 11.5–15.4)
LYMPHOCYTES # BLD AUTO: 2.53 THOUSANDS/ΜL (ref 0.6–4.47)
LYMPHOCYTES NFR BLD AUTO: 36 % (ref 14–44)
MCH RBC QN AUTO: 32.2 PG (ref 26.8–34.3)
MCHC RBC AUTO-ENTMCNC: 32 G/DL (ref 31.4–37.4)
MCV RBC AUTO: 101 FL (ref 82–98)
MONOCYTES # BLD AUTO: 0.57 THOUSAND/ΜL (ref 0.17–1.22)
MONOCYTES NFR BLD AUTO: 8 % (ref 4–12)
NEUTROPHILS # BLD AUTO: 3.85 THOUSANDS/ΜL (ref 1.85–7.62)
NEUTS SEG NFR BLD AUTO: 55 % (ref 43–75)
NRBC BLD AUTO-RTO: 0 /100 WBCS
PLATELET # BLD AUTO: 223 THOUSANDS/UL (ref 149–390)
PMV BLD AUTO: 10.6 FL (ref 8.9–12.7)
POTASSIUM SERPL-SCNC: 4.1 MMOL/L (ref 3.5–5.3)
PROT SERPL-MCNC: 6.9 G/DL (ref 6.4–8.2)
RBC # BLD AUTO: 3.57 MILLION/UL (ref 3.81–5.12)
SODIUM SERPL-SCNC: 139 MMOL/L (ref 136–145)
TSH SERPL DL<=0.05 MIU/L-ACNC: 1.82 UIU/ML (ref 0.36–3.74)
VENIPUNCTURE: NORMAL
WBC # BLD AUTO: 7.07 THOUSAND/UL (ref 4.31–10.16)

## 2018-05-07 PROCEDURE — 84443 ASSAY THYROID STIM HORMONE: CPT

## 2018-05-07 PROCEDURE — 80076 HEPATIC FUNCTION PANEL: CPT

## 2018-05-07 PROCEDURE — 85025 COMPLETE CBC W/AUTO DIFF WBC: CPT

## 2018-05-07 PROCEDURE — 36415 COLL VENOUS BLD VENIPUNCTURE: CPT

## 2018-05-07 PROCEDURE — 80048 BASIC METABOLIC PNL TOTAL CA: CPT

## 2018-05-15 RX ORDER — MELATONIN
1 DAILY
COMMUNITY
Start: 2014-09-10

## 2018-05-15 RX ORDER — URSODIOL 250 MG/1
TABLET, FILM COATED ORAL
COMMUNITY
Start: 2015-07-30 | End: 2018-08-27 | Stop reason: SDUPTHER

## 2018-05-17 ENCOUNTER — OFFICE VISIT (OUTPATIENT)
Dept: FAMILY MEDICINE CLINIC | Facility: CLINIC | Age: 83
End: 2018-05-17
Payer: COMMERCIAL

## 2018-05-17 VITALS
DIASTOLIC BLOOD PRESSURE: 88 MMHG | RESPIRATION RATE: 12 BRPM | TEMPERATURE: 97.8 F | SYSTOLIC BLOOD PRESSURE: 122 MMHG | HEART RATE: 64 BPM

## 2018-05-17 DIAGNOSIS — M81.0 OSTEOPOROSIS, UNSPECIFIED OSTEOPOROSIS TYPE, UNSPECIFIED PATHOLOGICAL FRACTURE PRESENCE: ICD-10-CM

## 2018-05-17 DIAGNOSIS — Z00.00 MEDICARE ANNUAL WELLNESS VISIT, SUBSEQUENT: Primary | ICD-10-CM

## 2018-05-17 DIAGNOSIS — E55.9 VITAMIN D DEFICIENCY: ICD-10-CM

## 2018-05-17 DIAGNOSIS — K74.3 PRIMARY BILIARY CHOLANGITIS (HCC): ICD-10-CM

## 2018-05-17 DIAGNOSIS — F03.90 DEMENTIA WITHOUT BEHAVIORAL DISTURBANCE, UNSPECIFIED DEMENTIA TYPE (HCC): ICD-10-CM

## 2018-05-17 DIAGNOSIS — I10 ESSENTIAL HYPERTENSION: ICD-10-CM

## 2018-05-17 PROCEDURE — 1101F PT FALLS ASSESS-DOCD LE1/YR: CPT | Performed by: FAMILY MEDICINE

## 2018-05-17 PROCEDURE — G0439 PPPS, SUBSEQ VISIT: HCPCS | Performed by: FAMILY MEDICINE

## 2018-05-17 PROCEDURE — 99214 OFFICE O/P EST MOD 30 MIN: CPT | Performed by: FAMILY MEDICINE

## 2018-05-17 NOTE — PROGRESS NOTES
Chief Complaint   Patient presents with   [facility] Wellness Visit     Annual wellness visit   Follow-up     6 month follow up for Anemia, Dementia, Hypertension, and Osteoporosis  HPI:  Ulisses Pichardo is a 80 y o  female here for her Subsequent Wellness Visit  Past Medical History:   Diagnosis Date    Coronary artery disease     Hypokalemia     resolved 12/02/2016    Peripheral neuropathy     Scoliosis     Stroke syndrome [facility])     last assessed 01/03/2013     Past Surgical History:   Procedure Laterality Date    BLADDER SURGERY      reconstruction    CHOLECYSTECTOMY      laparoscopic     Family History   Problem Relation Age of Onset    Hypertension Sister     Hypothyroidism Sister     No Known Problems Family      History   Smoking Status    Never Smoker   Smokeless Tobacco    Not on file     History   Alcohol Use No      History   Drug use: Unknown     There were no vitals taken for this visit  Current Outpatient Prescriptions   Medication Sig Dispense Refill    aspirin-dipyridamole (AGGRENOX)  mg per 12 hr capsule TAKE 1 CAPSULE TWICE A  capsule 1    cholecalciferol (VITAMIN D3) 1,000 units tablet Take 1 tablet by mouth daily      ursodiol (ACTIGALL) 250 mg tablet Take by mouth      zinc oxide (DESITIN) 13 % cream Apply topically       No current facility-administered medications for this visit  Allergies   Allergen Reactions    Atropine     Sulfamethoxazole-Trimethoprim      Other reaction(s): acute renal failure  Category:  Adverse Reaction;      Immunization History   Administered Date(s) Administered    Influenza Split High Dose Preservative Free IM 09/10/2014, 09/08/2015, 10/13/2016, 11/15/2017    Influenza TIV (IM) 11/22/2011, 12/04/2012, 11/11/2013    Pneumococcal Conjugate 13-Valent 05/19/2015    Pneumococcal Polysaccharide PPV23 01/01/2006, 06/02/2016    Td (adult), adsorbed 06/25/2007       Patient Care Team:  Shaneka Dinh MD as PCP - Juan Barillas MD    Medicare Screening Tests and Risk Assessments:  AWV Clinical     ISAR:   Previous hospitalizations?:  No       Once in a Lifetime Medicare Screening:   AAA screening performed? (if performed, please add date to Health Maintenance):  No       Medicare Screening Tests and Risk Assessment:   AAA Risk Assessment     Family history of AAA:  No   Osteoporosis Risk Assessment     Female:  Yes   :  Yes    Age over 48:  Yes Low body weight (<127lbs):  Yes   HIV Risk Assessment        Drug and Alcohol Use:   Tobacco use    Cigarettes:  never smoker    Smokeless:  never used smokeless tobacco    Tobacco use duration    Tobacco Cessation Readiness    Alcohol use    Alcohol use:  never    Alcohol Treatment Readiness   Illicit Drug Use    Drug use:  never        Diet & Exercise:   Diet   What is your diet?:  Regular   How many servings a day of the following:   Exercise    Do you currently exercise?:  yes    Frequency:  occasional       Cognitive Impairment Screening:   Depression screening preformed:  Yes     PHQ-9 Depression scale score:  0   Cognitive Impairment Screening        Functional Ability/Level of Safety:   Hearing    Left:  normal Right:  normal   Hearing aid:  No    Hearing Impairment Assessment    Current Activities    Status:  no ADL's   Help needed with the folllowing:    Using the phone:  Yes Transportation:  Yes   Shopping:  Yes Preparing Meals:  Yes   Doing Housework:  Yes Doing Laundry:  Yes   Managing Medications:  Yes Managing Money:  Yes   ADL    Fall Risk   Have you fallen in the last 12 months?:  No    How many times?:  0    Injury History   Cogitive Impairment:  Yes        Home Safety:   Home Safety Risk Factors       Advanced Directives:   Advanced Directives    Living Will:  Yes Durable POA for healthcare:   Yes   Advanced directive:  Yes    Patient's End of Life Decisions        Urinary Incontinence:       Glaucoma:            Provider Screening     Preventative Screening/Counseling:   Cardiovascular Screening/Counseling:   (Labs Q5 years, EKG optional one-time)         Diabetes Screening/Counseling:   (2 tests/year if Pre-Diabetes or 1 test/year if no Diabetes)         Colorectal Cancer Screening/Counseling:   (FOBT Q1 yr; Flex Sig Q4 yrs or Q10 yrs after Screening Colonoscopy; Screening Colonoscpy Q2 yrs High Risk or Q10 yrs Low Risk; Barium Enema Q2 yrs High Risk or Q4 yrs Low Risk)         Prostate Cancer Screening/Counseling:   (Annual)          Breast Cancer Screening/Counseling:   (Baseline Age 28 - 43; Annual Age 36+)         Cervical Cancer Screening/Counseling:   (Annual for High Risk or Childbearing Age with Abnormal Pap in Last 3 yrs; Every 2 all others)         Osteoporosis Screening/Counseling:   (Every 2 Yrs if at risk or more if medically necessary)         AAA Screening/Counseling:   (Once per Lifetime with risk factors)    Family History of AAA:  No           Glaucoma Screening/Counseling:   (Annual)         HIV Screening/Counseling:   (Voluntary; Once annually for high risk OR 3 times for Pregnancy at diagnosis of IUP; 3rd trimester; and at Labor         Hepatitis C Screening:             Immunizations: Other Preventative Couseling (Non-Medicare Wellness Visit Required):       Referrals (Non-Medicare Wellness Visit Required):       Medical Equipment/Suppliers:           No exam data present    Physical Exam :  Physical Exam    Reviewed Updated [facility] Prior Wellness Visits:   Last Medicare wellness visit information was reviewed, patient interviewed , no change since last AWVyes  Last Medicare wellness visit information was reviewed, patient interviewed and updates made to the record today yes    Assessment and Plan:  1  Medicare annual wellness visit, subsequent     2  Dementia without behavioral disturbance, unspecified dementia type     3  Essential hypertension  CBC    Comprehensive metabolic panel   4   Osteoporosis, unspecified osteoporosis type, unspecified pathological fracture presence     5  Vitamin D deficiency     6  Primary biliary cholangitis (HCC)  CBC    Comprehensive metabolic panel     Has living will per son-in-law---DNR  POA---daughter       Health Maintenance Due   Topic Date Due    Urinary Incontinence Screening  10/26/1992    GLAUCOMA SCREENING 67+ YR  10/26/1994    DTaP,Tdap,and Td Vaccines (1 - Tdap) 06/26/2007    Annual Wellnes Visit (AWV)  10/13/2017

## 2018-05-17 NOTE — PROGRESS NOTES
Chief Complaint   Patient presents with   [facility] Wellness Visit     Annual wellness visit   Follow-up     6 month follow up for Anemia, Dementia, Hypertension, and Osteoporosis  Health Maintenance   Topic Date Due    Urinary Incontinence Screening  10/26/1992    GLAUCOMA SCREENING 67+ YR  10/26/1994    DTaP,Tdap,and Td Vaccines (1 - Tdap) 06/26/2007    Annual Wellnes Visit (AWV)  10/13/2017    INFLUENZA VACCINE  09/01/2018    Fall Risk  11/15/2018    TSH LEVEL  05/07/2019    Depression Screening PHQ-9  05/17/2019    PNEUMOCOCCAL POLYSACCHARIDE VACCINE AGE 72 AND OVER  Completed     Assessment/Plan:  Reviewed lab in 5/2018  CBC ok  CMP ok  TSH normal    Dementia---stable  Continue ASA-dipyridamole 25-200mg bid  HTN---controlled ok  Osteoporosis---not on any medications  Refused Dexa  VitD deficiency---on vitD 1000IU daily  Primary biliary cholangitis---continue ursodiol  FU GI as scheduled  Got prevnar 5/2015  Got pneumovax 6/2016  Fall precautions  RTO in 6 month or sooner as needed  Diagnoses and all orders for this visit:    Medicare annual wellness visit, subsequent    Dementia without behavioral disturbance, unspecified dementia type    Essential hypertension  -     CBC; Future  -     Comprehensive metabolic panel; Future    Osteoporosis, unspecified osteoporosis type, unspecified pathological fracture presence    Vitamin D deficiency    Primary biliary cholangitis (HCC)  -     CBC; Future  -     Comprehensive metabolic panel; Future          Subjective:      Patient ID: Cassie Gonzalez is a 80 y o  female  HPI    Pt is here with son-in-law  Dementia ---stable  Son-in-law takes care of her at home  Pt can feed herself  Appetite is good per son-in-law  BM normal  Sleep ok  She is usually sit at home  No rash  No combative behavior  Pt refused to walk because she is afraid of falling  Usually son-in-law helped her go to bathroom     Urinary incontinence ---at least 4-5 pads per day  Need help for bathing, dressing ADL's  Pt had hx of CVA in 1992 and had left side deficit  She is on ASA-dipyridamole bid  Had some bruise on legs  HTN---BP today 122/88  Denies headache, SOB or CP  Does not take any BP meds  Osteoporosis---Stopped alendronate for 2 years  Refused more Dexa  VitD deficiency---on vitD 1000IU daily  FU GI for elevated liver enzymes/primary biliary cholangitis  On ursodiol which helped  Live with daughter and son-in-law  Denies recent falls  The following portions of the patient's history were reviewed and updated as appropriate: allergies, current medications, past family history, past medical history, past social history, past surgical history and problem list     Review of Systems   Constitutional: Negative for appetite change, chills and fever  HENT: Negative for congestion, ear pain, sinus pain and sore throat  Eyes: Negative for discharge and itching  Respiratory: Negative for apnea, cough, chest tightness, shortness of breath and wheezing  Cardiovascular: Negative for chest pain, palpitations and leg swelling  Gastrointestinal: Negative for abdominal pain, anal bleeding, constipation, diarrhea, nausea and vomiting  Endocrine: Negative for cold intolerance, heat intolerance and polyuria  Genitourinary: Negative for difficulty urinating and dysuria  Musculoskeletal: Negative for arthralgias, back pain and myalgias  Skin: Negative for rash  Neurological: Negative for dizziness and headaches  Psychiatric/Behavioral: Negative for agitation  Objective:    Vitals:    05/17/18 1243   BP: 122/88   Pulse: 64   Resp: 12   Temp: 97 8 °F (36 6 °C)              Physical Exam   Constitutional: She appears well-developed  No distress  HENT:   Head: Normocephalic     Right Ear: External ear normal    Left Ear: External ear normal    Nose: Nose normal    Mouth/Throat: Oropharynx is clear and moist    Eyes: Conjunctivae are normal  Pupils are equal, round, and reactive to light  Right eye exhibits no discharge  Left eye exhibits no discharge  Neck: Normal range of motion  No thyromegaly present  Cardiovascular: Normal rate, regular rhythm and normal heart sounds  Exam reveals no gallop and no friction rub  No murmur heard  Pulmonary/Chest: Effort normal and breath sounds normal  No respiratory distress  She has no wheezes  She has no rales  She exhibits no tenderness  Abdominal: Soft  Bowel sounds are normal    Musculoskeletal:   On wheelchair   Lymphadenopathy:     She has no cervical adenopathy  Psychiatric: She has a normal mood and affect

## 2018-08-27 ENCOUNTER — TELEPHONE (OUTPATIENT)
Dept: GASTROENTEROLOGY | Facility: MEDICAL CENTER | Age: 83
End: 2018-08-27

## 2018-08-27 DIAGNOSIS — K74.3 PRIMARY BILIARY CHOLANGITIS (HCC): Primary | ICD-10-CM

## 2018-08-27 RX ORDER — URSODIOL 250 MG/1
TABLET, FILM COATED ORAL
Qty: 270 TABLET | Refills: 0 | Status: SHIPPED | OUTPATIENT
Start: 2018-08-27 | End: 2018-11-25 | Stop reason: SDUPTHER

## 2018-08-27 NOTE — TELEPHONE ENCOUNTER
Sent 90 day supply  Will need appt in next 3 months prior to further refills   Please call to schedule

## 2018-08-27 NOTE — TELEPHONE ENCOUNTER
Dr Syl Hewitt pt's son in-law Juanita Sanders called stating pt needs a refill on her Ursodiol 250mg 90 day supply  It should be sent to [facility]   Juanita Sanders can be reached at [phone]

## 2018-09-27 DIAGNOSIS — F01.50 VASCULAR DEMENTIA WITHOUT BEHAVIORAL DISTURBANCE (HCC): ICD-10-CM

## 2018-09-28 RX ORDER — ASPIRIN AND DIPYRIDAMOLE 25; 200 MG/1; MG/1
CAPSULE, EXTENDED RELEASE ORAL
Qty: 180 CAPSULE | Refills: 1 | Status: SHIPPED | OUTPATIENT
Start: 2018-09-28

## 2018-10-30 ENCOUNTER — OFFICE VISIT (OUTPATIENT)
Dept: GASTROENTEROLOGY | Facility: AMBULARY SURGERY CENTER | Age: 83
End: 2018-10-30
Payer: COMMERCIAL

## 2018-10-30 VITALS
HEART RATE: 64 BPM | TEMPERATURE: 97.1 F | BODY MASS INDEX: 24.14 KG/M2 | RESPIRATION RATE: 18 BRPM | SYSTOLIC BLOOD PRESSURE: 110 MMHG | DIASTOLIC BLOOD PRESSURE: 70 MMHG | WEIGHT: 115 LBS | HEIGHT: 58 IN

## 2018-10-30 DIAGNOSIS — K74.3 PRIMARY BILIARY CHOLANGITIS (HCC): Primary | ICD-10-CM

## 2018-10-30 PROCEDURE — 99213 OFFICE O/P EST LOW 20 MIN: CPT | Performed by: PHYSICIAN ASSISTANT

## 2018-10-30 NOTE — PROGRESS NOTES
Assessment and Plan    #1  Primary biliary cholangitis:  Patient has history of isolated alkaline phosphatase elevation, last was checked in May 2018 which showed a level of 120  She has been taking Ursodiol  -we will recheck CBC and CMP to see what her levels are  If her alkaline phosphatase is completely normal, we may consider stopping her ursodiol therapy  For now we will continue this  -Due to patient's age, family has declined [facility]  screening for patient  Agreeable to labs  -follow up in 1 year or sooner it needed  -Call with any worsening symptoms      --------------------------------------------------------------------------------------------------------------------    Chief Complaint:  Follow-up for primary biliary cholangitis    HPI: Radha Rogers is a 80 y o  female who presents today for follow up for primary biliary cholangitis  Patient was last seen 1 year ago  She is taking Ursodiol  Her last lab draw was in May 2018 which showed a slightly elevated alk-phos at 120  Patient denies any nausea, vomiting, abdominal pain, dysphagia, unexpected weight loss, diarrhea, constipation, blood in stool, or black tarry stools  Patient's son reports overall no issues       Review of Systems:   General: negative for fatigue, fever, night sweats or unexpected weight loss  Psychological: negative for anxiety or depression  Ophthalmic: negative for blurry vision or scleral icterus  ENT: negative for headaches, oral lesions, sore throat, vocal changes or dysphagia  Hematological and Lymphatic: negative for pallor or swollen lymph nodes  Respiratory: negative for cough, shortness of breath or wheezing  Cardiovascular: negative for chest pain, edema or murmur  Gastrointestinal: as mentioned in HPI  Genito-Urinary: negative for dysuria or incontinence  Musculoskeletal: negative for joint pain, joint stiffness or joint swelling  Dermatological: negative for pruritus, rash, or jaundice    Current Medications  Current Outpatient Prescriptions   Medication Sig Dispense Refill    aspirin-dipyridamole (AGGRENOX)  mg per 12 hr capsule TAKE 1 CAPSULE TWICE A  capsule 1    cholecalciferol (VITAMIN D3) 1,000 units tablet Take 1 tablet by mouth daily      ursodiol (ACTIGALL) 250 mg tablet Take 2 tablets in the morning and one tablet in the evening 270 tablet 0    zinc oxide (DESITIN) 13 % cream Apply topically       No current facility-administered medications for this visit          Past Medical History  Past Medical History:   Diagnosis Date    Coronary artery disease     Hypokalemia     resolved 12/02/2016    Peripheral neuropathy     Scoliosis     Stroke syndrome     last assessed 01/03/2013       Past Surgical History  Past Surgical History:   Procedure Laterality Date    BLADDER SURGERY      reconstruction    CHOLECYSTECTOMY      laparoscopic       Past Social History   Social History     Social History    Marital status:      Spouse name: N/A    Number of children: N/A    Years of education: N/A     Social History Main Topics    Smoking status: Never Smoker    Smokeless tobacco: Never Used    Alcohol use No    Drug use: No    Sexual activity: Not Asked     Other Topics Concern    None     Social History Narrative    None       The following portions of the patient's history were reviewed and updated as appropriate: allergies, current medications, past family history, past medical history, past social history, past surgical history and problem list     Vital Signs  Vitals:    10/30/18 1014   BP: 110/70   BP Location: Right arm   Patient Position: Sitting   Cuff Size: Standard   Pulse: 64   Resp: 18   Temp: (!) 97 1 °F (36 2 °C)   TempSrc: Tympanic   Weight: 52 2 kg (115 lb)   Height: 4' 10" (1 473 m)       Physical Exam:  General appearance: alert, elderly female, cooperative, no distress  HEENT: normocephalic, anicteric, no eye erythema or discharge, no oropharyngeal thrush  Neck: supple, trachea midline, no adenopathy  Lungs: CTA b/l, no rales, rhonchi, or wheezing, unlabored respirations  Heart: RRR, no murmur, rubs, or gallops  Abdomen: soft, non-tender, non-distended, normal bowel sounds, no masses or organomegaly  Rectal: deferred  Extremities: no cyanosis, clubbing, or edema  Musculoskeletal: wheelchair bound  Skin: color and texture normal, no jaundice, no rashes or lesions  Psychiatric: alert and oriented, normal affect and behavior

## 2018-10-30 NOTE — LETTER
October 30, 2018     Ari Caba, [address]    Patient: Carli Araiza   YOB: 1927   Date of Visit: 10/30/2018       Dear Dr Galvin Plants: Thank you for referring Madai Juarez to me for evaluation  Below are my notes for this consultation  If you have questions, please do not hesitate to call me  I look forward to following your patient along with you  Sincerely,        Lurdes Calzada PA-C        CC: No Recipients  Lurdes Calzada PA-C  10/30/2018 10:37 AM  Sign at close encounter  Assessment and Plan    #1  Primary biliary cholangitis:  Patient has history of isolated alkaline phosphatase elevation, last was checked in May 2018 which showed a level of 120  She has been taking Ursodiol  -we will recheck CBC and CMP to see what her levels are  If her alkaline phosphatase is completely normal, we may consider stopping her ursodiol therapy  For now we will continue this  -Due to patient's age, family has declined [facility]  screening for patient  Agreeable to labs  -follow up in 1 year or sooner it needed  -Call with any worsening symptoms      --------------------------------------------------------------------------------------------------------------------    Chief Complaint:  Follow-up for primary biliary cholangitis    HPI: Carli Araiza is a 80 y o  female who presents today for follow up for primary biliary cholangitis  Patient was last seen 1 year ago  She is taking Ursodiol  Her last lab draw was in May 2018 which showed a slightly elevated alk-phos at 120  Patient denies any nausea, vomiting, abdominal pain, dysphagia, unexpected weight loss, diarrhea, constipation, blood in stool, or black tarry stools  Patient's son reports overall no issues       Review of Systems:   General: negative for fatigue, fever, night sweats or unexpected weight loss  Psychological: negative for anxiety or depression  Ophthalmic: negative for blurry vision or scleral icterus  ENT: negative for headaches, oral lesions, sore throat, vocal changes or dysphagia  Hematological and Lymphatic: negative for pallor or swollen lymph nodes  Respiratory: negative for cough, shortness of breath or wheezing  Cardiovascular: negative for chest pain, edema or murmur  Gastrointestinal: as mentioned in HPI  Genito-Urinary: negative for dysuria or incontinence  Musculoskeletal: negative for joint pain, joint stiffness or joint swelling  Dermatological: negative for pruritus, rash, or jaundice    Current Medications  Current Outpatient Prescriptions   Medication Sig Dispense Refill    aspirin-dipyridamole (AGGRENOX)  mg per 12 hr capsule TAKE 1 CAPSULE TWICE A  capsule 1    cholecalciferol (VITAMIN D3) 1,000 units tablet Take 1 tablet by mouth daily      ursodiol (ACTIGALL) 250 mg tablet Take 2 tablets in the morning and one tablet in the evening 270 tablet 0    zinc oxide (DESITIN) 13 % cream Apply topically       No current facility-administered medications for this visit          Past Medical History  Past Medical History:   Diagnosis Date    Coronary artery disease     Hypokalemia     resolved 12/02/2016    Peripheral neuropathy     Scoliosis     Stroke syndrome     last assessed 01/03/2013       Past Surgical History  Past Surgical History:   Procedure Laterality Date    BLADDER SURGERY      reconstruction    CHOLECYSTECTOMY      laparoscopic       Past Social History   Social History     Social History    Marital status:      Spouse name: N/A    Number of children: N/A    Years of education: N/A     Social History Main Topics    Smoking status: Never Smoker    Smokeless tobacco: Never Used    Alcohol use No    Drug use: No    Sexual activity: Not Asked     Other Topics Concern    None     Social History Narrative    None       The following portions of the patient's history were reviewed and updated as appropriate: allergies, current medications, past family history, past medical history, past social history, past surgical history and problem list     Vital Signs  Vitals:    10/30/18 1014   BP: 110/70   BP Location: Right arm   Patient Position: Sitting   Cuff Size: Standard   Pulse: 64   Resp: 18   Temp: (!) 97 1 °F (36 2 °C)   TempSrc: Tympanic   Weight: 52 2 kg (115 lb)   Height: 4' 10" (1 473 m)       Physical Exam:  General appearance: alert, elderly female, cooperative, no distress  HEENT: normocephalic, anicteric, no eye erythema or discharge, no oropharyngeal thrush  Neck: supple, trachea midline, no adenopathy  Lungs: CTA b/l, no rales, rhonchi, or wheezing, unlabored respirations  Heart: RRR, no murmur, rubs, or gallops  Abdomen: soft, non-tender, non-distended, normal bowel sounds, no masses or organomegaly  Rectal: deferred  Extremities: no cyanosis, clubbing, or edema  Musculoskeletal: wheelchair bound  Skin: color and texture normal, no jaundice, no rashes or lesions  Psychiatric: alert and oriented, normal affect and behavior

## 2018-11-20 ENCOUNTER — OFFICE VISIT (OUTPATIENT)
Dept: FAMILY MEDICINE CLINIC | Facility: CLINIC | Age: 83
End: 2018-11-20
Payer: COMMERCIAL

## 2018-11-20 VITALS
HEART RATE: 60 BPM | RESPIRATION RATE: 16 BRPM | SYSTOLIC BLOOD PRESSURE: 102 MMHG | TEMPERATURE: 97.8 F | DIASTOLIC BLOOD PRESSURE: 56 MMHG

## 2018-11-20 DIAGNOSIS — M81.0 OSTEOPOROSIS, UNSPECIFIED OSTEOPOROSIS TYPE, UNSPECIFIED PATHOLOGICAL FRACTURE PRESENCE: ICD-10-CM

## 2018-11-20 DIAGNOSIS — I10 ESSENTIAL HYPERTENSION: ICD-10-CM

## 2018-11-20 DIAGNOSIS — Z23 NEED FOR INFLUENZA VACCINATION: ICD-10-CM

## 2018-11-20 DIAGNOSIS — E55.9 VITAMIN D DEFICIENCY: ICD-10-CM

## 2018-11-20 DIAGNOSIS — F03.90 DEMENTIA WITHOUT BEHAVIORAL DISTURBANCE, UNSPECIFIED DEMENTIA TYPE (HCC): Primary | ICD-10-CM

## 2018-11-20 PROCEDURE — G0008 ADMIN INFLUENZA VIRUS VAC: HCPCS

## 2018-11-20 PROCEDURE — 3725F SCREEN DEPRESSION PERFORMED: CPT

## 2018-11-20 PROCEDURE — 99214 OFFICE O/P EST MOD 30 MIN: CPT | Performed by: FAMILY MEDICINE

## 2018-11-20 PROCEDURE — 1036F TOBACCO NON-USER: CPT | Performed by: FAMILY MEDICINE

## 2018-11-20 PROCEDURE — 4040F PNEUMOC VAC/ADMIN/RCVD: CPT

## 2018-11-20 PROCEDURE — 1101F PT FALLS ASSESS-DOCD LE1/YR: CPT | Performed by: FAMILY MEDICINE

## 2018-11-20 PROCEDURE — 90662 IIV NO PRSV INCREASED AG IM: CPT

## 2018-11-20 NOTE — PROGRESS NOTES
Chief Complaint   Patient presents with    Follow-up     6 month follow up  Health Maintenance   Topic Date Due    Urinary Incontinence Screening  10/26/1992    DTaP,Tdap,and Td Vaccines (1 - Tdap) 06/26/2007    INFLUENZA VACCINE  07/01/2018    Fall Risk  05/17/2019    Depression Screening PHQ  05/17/2019    Medicare Annual Wellness Visit (AWV)  05/17/2019    Pneumococcal PPSV23/PCV13 65+ Years / Low and Medium Risk  Completed     Assessment/Plan:    Dementia---stable  Continue ASA-dipyridamole 25-200mg bid  HTN---controlled ok  Osteoporosis---not on any medications  Refused Dexa  Fall precautions  VitD deficiency---on vitD 1000IU daily  Primary biliary cholangitis---continue ursodiol  FU GI as scheduled       Give flu shot today  Got prevnar 5/2015  Got pneumovax 6/2016  Fall precautions  RTO in 6 month or sooner as needed  Diagnoses and all orders for this visit:    Dementia without behavioral disturbance, unspecified dementia type    Essential hypertension    Vitamin D deficiency    Osteoporosis, unspecified osteoporosis type, unspecified pathological fracture presence    Need for influenza vaccination  -     influenza vaccine, [id number], high-dose, PF 0 5 mL, for patients 65 yr+ (FLUZONE HIGH-DOSE)          Subjective:      Patient ID: Nela Doss is a 80 y o  female  HPI    Pt is here with son-in-law  Dementia ---stable  Son-in-law takes care of her at home  Pt cannot feed herself any more, son-in-law feed her  Appetite is good per son-in-law  BM normal    Sleep ok  She is usually sit at home  No rash  Son-in-law states they use desitin 2-3 times per week which works well  No combative behavior  Pt refused to walk because she is afraid of falling  Usually son-in-law helped her go to bathroom  Urinary incontinence ---at least 4-5 pads per day  Need help for bathing, dressing ADL's  Pt had hx of CVA in 1992 and had left side deficit   She is on ASA-dipyridamole bid  Had some bruise on legs       HTN---BP today 122/88  Denies headache, SOB or CP  Does not take any BP meds       Osteoporosis---Stopped alendronate for 2 years  Refused more Dexa       VitD deficiency---on vitD 1000IU daily       FU GI for elevated liver enzymes/primary biliary cholangitis  On ursodiol which helped       Live with daughter and son-in-law  Denies recent falls           The following portions of the patient's history were reviewed and updated as appropriate: allergies, current medications, past family history, past medical history, past social history, past surgical history and problem list     Review of Systems   Constitutional: Negative for appetite change, chills and fever  HENT: Negative for congestion, ear pain, sinus pain and sore throat  Eyes: Negative for discharge and itching  Respiratory: Negative for apnea, cough, chest tightness, shortness of breath and wheezing  Cardiovascular: Negative for chest pain, palpitations and leg swelling  Gastrointestinal: Negative for abdominal pain, anal bleeding, constipation, diarrhea, nausea and vomiting  Endocrine: Negative for cold intolerance, heat intolerance and polyuria  Genitourinary: Negative for difficulty urinating and dysuria  Musculoskeletal: Negative for arthralgias, back pain and myalgias  Skin: Negative for rash  Neurological: Negative for dizziness and headaches  Psychiatric/Behavioral: Negative for agitation  Objective:      /56 (BP Location: Left leg, Patient Position: Sitting, Cuff Size: Standard)   Pulse 60   Temp 97 8 °F (36 6 °C) (Tympanic)   Resp 16          Physical Exam   Constitutional: She appears well-developed  No distress  HENT:   Head: Normocephalic  Right Ear: External ear normal    Left Ear: External ear normal    Nose: Nose normal    Mouth/Throat: Oropharynx is clear and moist    Eyes: Pupils are equal, round, and reactive to light   Conjunctivae are normal  Right eye exhibits no discharge  Left eye exhibits no discharge  Neck: Normal range of motion  No thyromegaly present  Cardiovascular: Normal rate, regular rhythm and normal heart sounds  Exam reveals no gallop and no friction rub  No murmur heard  Pulmonary/Chest: Effort normal and breath sounds normal  No respiratory distress  She has no wheezes  She has no rales  She exhibits no tenderness  Abdominal: Soft  Bowel sounds are normal    Musculoskeletal:   On wheelchair   Lymphadenopathy:     She has no cervical adenopathy  Neurological: She is alert  Psychiatric: She has a normal mood and affect

## 2018-11-25 DIAGNOSIS — K74.3 PRIMARY BILIARY CHOLANGITIS (HCC): ICD-10-CM

## 2018-11-26 RX ORDER — URSODIOL 250 MG/1
TABLET, FILM COATED ORAL
Qty: 270 TABLET | Refills: 2 | Status: SHIPPED | OUTPATIENT
Start: 2018-11-26

## 2018-12-07 ENCOUNTER — TELEPHONE (OUTPATIENT)
Dept: FAMILY MEDICINE CLINIC | Facility: CLINIC | Age: 83
End: 2018-12-07
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
## 2018-12-08 ENCOUNTER — TELEPHONE (OUTPATIENT)
Dept: FAMILY MEDICINE CLINIC | Facility: CLINIC | Age: 83
End: 2018-12-08

## 2018-12-08 DIAGNOSIS — R26.2 AMBULATORY DYSFUNCTION: ICD-10-CM

## 2018-12-08 DIAGNOSIS — L98.491 SKIN ULCER, LIMITED TO BREAKDOWN OF SKIN (HCC): ICD-10-CM

## 2018-12-08 DIAGNOSIS — F03.90 DEMENTIA WITHOUT BEHAVIORAL DISTURBANCE, UNSPECIFIED DEMENTIA TYPE (HCC): Primary | ICD-10-CM

## 2018-12-08 NOTE — TELEPHONE ENCOUNTER
Received a call from [facility] last evening that the patient was admitted there marginally  Apparently there were some home issues and she was admitted  (see note from yesterday)  I spoke with Tabatha with the contact number of [phone]  They are requesting a consult with the [facility] for evaluation and treatment  She does have a small open area that needs to have a treatment plan

## 2018-12-14 ENCOUNTER — APPOINTMENT (EMERGENCY)
Dept: RADIOLOGY | Facility: HOSPITAL | Age: 83
End: 2018-12-14
Payer: COMMERCIAL

## 2018-12-14 ENCOUNTER — HOSPITAL ENCOUNTER (EMERGENCY)
Facility: HOSPITAL | Age: 83
Discharge: HOME | End: 2018-12-14
Attending: EMERGENCY MEDICINE | Admitting: EMERGENCY MEDICINE
Payer: COMMERCIAL

## 2018-12-14 VITALS
BODY MASS INDEX: 22.99 KG/M2 | SYSTOLIC BLOOD PRESSURE: 111 MMHG | DIASTOLIC BLOOD PRESSURE: 58 MMHG | RESPIRATION RATE: 18 BRPM | WEIGHT: 110 LBS | HEART RATE: 94 BPM | OXYGEN SATURATION: 93 % | TEMPERATURE: 98.5 F

## 2018-12-14 DIAGNOSIS — L72.0 EPIDERMAL INCLUSION CYST: Primary | ICD-10-CM

## 2018-12-14 PROCEDURE — 99284 EMERGENCY DEPT VISIT MOD MDM: CPT

## 2018-12-14 PROCEDURE — 70450 CT HEAD/BRAIN W/O DYE: CPT

## 2018-12-14 NOTE — ED ATTENDING ATTESTATION
Eleonora Mcdaniels DO, saw and evaluated the patient  I have discussed the patient with the resident/non-physician practitioner and agree with the resident's/non-physician practitioner's findings, Plan of Care, and MDM as documented in the resident's/non-physician practitioner's note, except where noted  All available labs and Radiology studies were reviewed  At this point I agree with the current assessment done in the Emergency Department  I have conducted an independent evaluation of this patient a history and physical is as follows:    Patient is a 43-year-old female referred in from nursing home as a found a lump on the left parieto-occipital scalp  No recent trauma  Patient resides a locked dementia unit  There is a small marble-sized palpable mass in the left parieto-occipital region  There is no surrounding edema or erythema  History is unobtainable from patient secondary to advanced dementia  Will obtain head CT and reassess  Head CT reveals a dermal inclusion cyst, no bony lesions    Referred to General surgery for surgical removal     Critical Care Time  CritCare Time    Procedures

## 2018-12-14 NOTE — ED NOTES
Patients' dressing on sacral ulcer is compromised/soiled  Cleaned wound with soap and water and then placed sacral pad/dressing on wound  Dressing should be checked and changed, using the appropriate dressings within next 24 hours       Del Yip RN  12/14/18 4071

## 2018-12-14 NOTE — ED PROVIDER NOTES
History  Chief Complaint   Patient presents with    Mass     Patient brought in by EMS for mass that was found on L occipital area of head  HPI  58-year-old woman presents for evaluation of mass that was found on her parietal occipital area while nursing home staff was combing her hair  Patient is demented, in the locked dementia unit at [facility]  Per nursing home staff, she is nonverbal at baseline, she is bed-bound  She has only been at their facility since December 7th  Unclear if this was their previous however staff states that no one has noticed it previously  No reported falls  No other concerns this time  On evaluation with the patient, she is awake, says hello but does not really answer questions  Prior to Admission Medications   Prescriptions Last Dose Informant Patient Reported? Taking?   aspirin-dipyridamole (AGGRENOX)  mg per 12 hr capsule  Child No No   Sig: TAKE 1 CAPSULE TWICE A DAY   cholecalciferol (VITAMIN D3) 1,000 units tablet  Child Yes No   Sig: Take 1 tablet by mouth daily   ursodiol (ACTIGALL) 250 mg tablet   No No   Sig: TAKE 2 TABLETS IN THE MORNING AND 1 TABLET IN THE EVENING   zinc oxide (DESITIN) 13 % cream  Child Yes No   Sig: Apply topically      Facility-Administered Medications: None       Past Medical History:   Diagnosis Date    Coronary artery disease     Hypokalemia     resolved 12/02/2016    Peripheral neuropathy     Scoliosis     Stroke syndrome     last assessed 01/03/2013       Past Surgical History:   Procedure Laterality Date    BLADDER SURGERY      reconstruction    CHOLECYSTECTOMY      laparoscopic       Family History   Problem Relation Age of Onset    Hypertension Sister     Hypothyroidism Sister     No Known Problems Family      I have reviewed and agree with the history as documented      Social History   Substance Use Topics    Smoking status: Never Smoker    Smokeless tobacco: Never Used    Alcohol use No        Review of Systems   Unable to perform ROS: Dementia       Physical Exam  ED Triage Vitals [12/14/18 1021]   Temperature Pulse Respirations Blood Pressure SpO2   98 5 °F (36 9 °C) 103 18 120/81 94 %      Temp Source Heart Rate Source Patient Position - Orthostatic VS BP Location FiO2 (%)   Tympanic Monitor Lying Right arm --      Pain Score       No Pain           Orthostatic Vital Signs  Vitals:    12/14/18 1223 12/14/18 1430 12/14/18 1500 12/14/18 1530   BP: 130/73 134/79 123/69 127/61   Pulse: 97 98 100 98   Patient Position - Orthostatic VS:  Lying Lying Lying       Physical Exam   Constitutional: She is oriented to person, place, and time  She appears well-developed and well-nourished  No distress  HENT:   Head: Normocephalic and atraumatic  Right Ear: External ear normal    Left Ear: External ear normal    Mouth/Throat: Oropharynx is clear and moist    Eyes: Pupils are equal, round, and reactive to light  Conjunctivae and EOM are normal  Right eye exhibits no discharge  Left eye exhibits no discharge  No scleral icterus  Neck: Normal range of motion  Neck supple  No tracheal deviation present  No thyromegaly present  Cardiovascular: Normal rate, regular rhythm and intact distal pulses  Exam reveals no gallop and no friction rub  No murmur heard  Pulmonary/Chest: Effort normal and breath sounds normal  No stridor  No respiratory distress  She has no wheezes  She has no rales  Abdominal: Soft  Bowel sounds are normal  She exhibits no distension  There is no tenderness  There is no rebound and no guarding  Musculoskeletal: Normal range of motion  She exhibits no edema or deformity  Neurological: She is alert and oriented to person, place, and time  No cranial nerve deficit  Skin: Skin is warm and dry  No rash noted  She is not diaphoretic  No erythema  Patient has a mass proximal 3 cm is soft, left parietal-occipital   There is no redness or warmth or induration associated with this     Psychiatric: She has a normal mood and affect  Her behavior is normal  Thought content normal    Nursing note and vitals reviewed  ED Medications  Medications - No data to display    Diagnostic Studies  Results Reviewed     None                 CT head without contrast   Final Result by Anat Asif MD (12/14 1204)      No acute intracranial hemorrhage seen  A 2 x 1 6 cm subcutaneous left parietal scalp lesion with calcification without associated underlying bony erosive changes seen likely represents dermal inclusion cyst      Chronic infarct left posterior cerebral artery distribution         Workstation performed: [id number]               Procedures  Procedures      Phone Consults  ED Phone Contact    ED Course           Identification of Seniors at Risk      Most Recent Value   (ISAR) Identification of Seniors at Risk   Before the illness or injury that brought you to the Emergency, did you need someone to help you on a regular basis? 1 Filed at: 12/14/2018 1022   In the last 24 hours, have you needed more help than usual?  1 Filed at: 12/14/2018 1022   Have you been hospitalized for one or more nights during the past 6 months? 1 Filed at: 12/14/2018 1022   In general, do you see well? 1 Filed at: 12/14/2018 1022   In general, do you have serious problems with your memory? 1 Filed at: 12/14/2018 1022   Do you take more than three different medications every day?  0 Filed at: 12/14/2018 1022   ISAR Score  5 Filed at: 12/14/2018 1022                          [facility]  Number of Diagnoses or Management Options  Diagnosis management comments: 15-year-old demented woman from locked dementia unit  No reported falls now here for evaluation small cutaneous mass on left parietal occipital scalp  Will evaluate with CT head      CritCare Time    Disposition  Final diagnoses:   Epidermal inclusion cyst - Left parietal occipital scalp     Time reflects when diagnosis was documented in both MDM as applicable and the Disposition within this note     Time User Action Codes Description Comment    12/14/2018 12:08 PM Ave Prost Add [L72 0] Epidermal inclusion cyst     12/14/2018 12:08 PM Ave Prost Modify [L72 0] Epidermal inclusion cyst Left parietal occipital scalp      ED Disposition     ED Disposition Condition Comment    Discharge  [address] discharge to home/self care  Condition at discharge: Stable        Follow-up Information     Follow up With Specialties Details Why Contact Info Additional [address] [facility] General Surgery Schedule an appointment as soon as possible for a visit in 1 week To follow up being seen emergency department [address] [phone]  [address] [place] [address], [phone]          Patient's Medications   Discharge Prescriptions    No medications on file     No discharge procedures on file  ED Provider  Attending physically available and evaluated [address]  I managed the patient along with the ED Attending      Electronically Signed by         Mansoor Pedraza MD  12/14/18 6156

## 2018-12-14 NOTE — DISCHARGE INSTRUCTIONS
Patient has a left parietal occipital scalp epidermal inclusion cyst   Please follow-up with surgery  Their phone number has been provided  Please return emergency department have any changes symptoms worsening symptoms or any other concerns  Epidermal Inclusion Cysts   WHAT YOU NEED TO KNOW:   Epidermal inclusion cysts are the most common skin cysts in adults  These cysts are usually round, firm lumps filled with a cheese-like material called keratin  They are also called epidermoid, keratin, or sebaceous cysts  They can be found almost anywhere on your body  The cysts are most common on the face, back, neck, chest, and around your ears  They can be caused by blocked hair follicle and oil gland ducts in your skin  Epidermal inclusion cysts may grow slowly but are not cancerous  DISCHARGE INSTRUCTIONS:   Follow up with your healthcare provider as directed:  Write down your questions so you remember to ask them at your visits  Medicines:   · Antibiotics  may be given to treat or prevent an infection  · Take your medicine as directed  Contact your healthcare provider if you think your medicine is not helping or if you have side effects  Tell him of her if you are allergic to any medicine  Keep a list of the medicines, vitamins, and herbs you take  Include the amounts, and when and why you take them  Bring the list or the pill bottles to follow-up visits  Carry your medicine list with you in case of an emergency  Contact your healthcare provider if:   · Your cyst becomes swollen, red, and painful  · Your cyst is large and leads to trouble moving or a deformed area  · You have questions or concerns about your condition or care  © 2017 [address] [employer] Information is for End User's use only and may not be sold, redistributed or otherwise used for commercial purposes   All illustrations and images included in CareNotes® are the copyrighted property of A D A M [employer]  or [employer]  The above information is an  only  It is not intended as medical advice for individual conditions or treatments  Talk to your doctor, nurse or pharmacist before following any medical regimen to see if it is safe and effective for you

## 2018-12-14 NOTE — ED NOTES
Pt scheduled for p/u via [facility] ambulance BLS @ 0914       Temi Loyola RN  12/14/18 5424

## 2018-12-17 ENCOUNTER — TELEPHONE (OUTPATIENT)
Dept: FAMILY MEDICINE CLINIC | Facility: CLINIC | Age: 83
End: 2018-12-17